# Patient Record
Sex: FEMALE | Race: WHITE | NOT HISPANIC OR LATINO | Employment: PART TIME | URBAN - METROPOLITAN AREA
[De-identification: names, ages, dates, MRNs, and addresses within clinical notes are randomized per-mention and may not be internally consistent; named-entity substitution may affect disease eponyms.]

---

## 2017-01-18 RX ORDER — HYDROCHLOROTHIAZIDE 12.5 MG/1
TABLET ORAL
Qty: 90 TABLET | Refills: 0 | Status: SHIPPED | OUTPATIENT
Start: 2017-01-18 | End: 2017-02-01

## 2017-02-01 ENCOUNTER — OFFICE VISIT (OUTPATIENT)
Dept: INTERNAL MEDICINE | Facility: CLINIC | Age: 42
End: 2017-02-01
Attending: FAMILY MEDICINE
Payer: COMMERCIAL

## 2017-02-01 VITALS
HEART RATE: 99 BPM | BODY MASS INDEX: 37.42 KG/M2 | HEIGHT: 61 IN | WEIGHT: 198.19 LBS | SYSTOLIC BLOOD PRESSURE: 146 MMHG | DIASTOLIC BLOOD PRESSURE: 94 MMHG

## 2017-02-01 DIAGNOSIS — I10 ESSENTIAL HYPERTENSION: Primary | ICD-10-CM

## 2017-02-01 PROCEDURE — 3077F SYST BP >= 140 MM HG: CPT | Mod: S$GLB,,, | Performed by: FAMILY MEDICINE

## 2017-02-01 PROCEDURE — 99999 PR PBB SHADOW E&M-EST. PATIENT-LVL III: CPT | Mod: PBBFAC,,, | Performed by: FAMILY MEDICINE

## 2017-02-01 PROCEDURE — 99213 OFFICE O/P EST LOW 20 MIN: CPT | Mod: S$GLB,,, | Performed by: FAMILY MEDICINE

## 2017-02-01 PROCEDURE — 3080F DIAST BP >= 90 MM HG: CPT | Mod: S$GLB,,, | Performed by: FAMILY MEDICINE

## 2017-02-01 RX ORDER — HYDROCHLOROTHIAZIDE 25 MG/1
25 TABLET ORAL DAILY
Qty: 90 TABLET | Refills: 0 | Status: SHIPPED | OUTPATIENT
Start: 2017-02-01 | End: 2017-03-02 | Stop reason: SDUPTHER

## 2017-02-01 NOTE — PROGRESS NOTES
Subjective:       Patient ID: Dale Trujillo is a 41 y.o. female.    Chief Complaint: No chief complaint on file.    HPI Comments: Pt presents today for HTN f/u. Log at home shows BP in ranges of 140-150/. Pt states no symptoms with HCTZ at present      Review of Systems   Constitutional: Negative.    Eyes: Negative.    Respiratory: Negative for cough, chest tightness and shortness of breath.    Cardiovascular: Negative for chest pain, palpitations and leg swelling.   Gastrointestinal: Negative.    Musculoskeletal: Negative.  Negative for joint swelling.   Skin: Negative.    Neurological: Negative for dizziness, weakness, light-headedness and headaches.       Objective:      Physical Exam   Constitutional: She is oriented to person, place, and time. She appears well-developed and well-nourished.   HENT:   Head: Normocephalic and atraumatic.   Eyes: Conjunctivae and EOM are normal. Pupils are equal, round, and reactive to light.   Neck: Normal range of motion. Neck supple. No thyromegaly present.   Cardiovascular: Normal rate, regular rhythm, normal heart sounds and intact distal pulses.    No murmur heard.  Pulmonary/Chest: Effort normal and breath sounds normal. No respiratory distress. She has no wheezes. She has no rales. She exhibits no tenderness.   Musculoskeletal: Normal range of motion. She exhibits no edema.   Lymphadenopathy:     She has no cervical adenopathy.   Neurological: She is alert and oriented to person, place, and time. She displays normal reflexes. No cranial nerve deficit. She exhibits normal muscle tone. Coordination normal.   Skin: Skin is warm. No erythema.   Psychiatric: She has a normal mood and affect. Her behavior is normal. Judgment and thought content normal.       Assessment:       HTN   Plan:       HTN still elevated. Pt advised to increase HCTZ and f/u with nurse in 4 weeks. Pt also informed that we may need to start norvasc if BP's are still elevated.  SE's of med d/w pt  ED  prompts d/w pt  RTC 4 weeks

## 2017-02-01 NOTE — MR AVS SNAPSHOT
[facility]  [address]  [place] [address]  Phone: [phone]  Fax: [phone]                  Dale Trujillo   2017 6:40 AM   Office Visit    Description:  Female : 1975   Provider:  Sharon Restrepo MD   Department:  [facility]                To Do List           Future Appointments        Provider Department Dept Phone    3/1/2017 7:00 AM NURSE, [facility] INTERNAL MEDICINE [facility]  Internal Medicine [phone]      Goals (5 Years of Data)     None       These Medications        Disp Refills Start End    hydrochlorothiazide (HYDRODIURIL) 25 MG tablet 90 tablet 0 2017 3/3/2017    Take 1 tablet (25 mg total) by mouth once daily. - Oral    Pharmacy: [facility]/pharmacy #[id number] - [place], [place] - [address]  #: [phone]         [facility] On Call     [facility] On Call Nurse Care Line -  Assistance  Registered nurses in the [facility] On Call Center provide clinical advisement, health education, appointment booking, and other advisory services.  Call for this free service at [phone].             Medications           Message regarding Medications     Verify the changes and/or additions to your medication regime listed below are the same as discussed with your clinician today.  If any of these changes or additions are incorrect, please notify your healthcare provider.        START taking these NEW medications        Refills    hydrochlorothiazide (HYDRODIURIL) 25 MG tablet 0    Sig: Take 1 tablet (25 mg total) by mouth once daily.    Class: Normal    Route: Oral           Verify that the below list of medications is an accurate representation of the medications you are currently taking.  If none reported, the list may be blank. If incorrect, please contact your healthcare provider. Carry this list with you in case of emergency.           Current Medications     albuterol 90 mcg/actuation inhaler Inhale 2 puffs into the lungs every 4  "(four) hours as needed.    BLISOVI FE 1/20, 28, 1 mg-20 mcg (21)/75 mg (7) per tablet TAKE 1 TABLET BY MOUTH ONCE DAILY.    cholecalciferol, vitamin D3, 1,000 unit capsule Take 1,000 Units by mouth once daily.    cranberry 1,000 mg Cap Take by mouth.    docusate sodium (COLACE) 50 MG capsule Take by mouth once daily.    ferrous gluconate (FERGON) 325 MG Tab Take 325 mg by mouth daily with breakfast.    fluticasone (FLONASE) 50 mcg/actuation nasal spray 2 sprays by Each Nare route once daily.    fluticasone-salmeterol 250-50 mcg/dose (ADVAIR) 250-50 mcg/dose diskus inhaler Inhale 1 puff into the lungs 2 (two) times daily.    levothyroxine (SYNTHROID) 200 MCG tablet TAKE 1 TABLET BY MOUTH EVERY DAY    loratadine (CLARITIN) 10 mg tablet Take 10 mg by mouth 2 (two) times daily.    metformin (GLUCOPHAGE) 500 MG tablet TAKE 1 TABLET (500 MG TOTAL) BY MOUTH 2 (TWO) TIMES DAILY WITH MEALS.    sulfamethoxazole-trimethoprim 800-160mg (BACTRIM DS) 800-160 mg Tab Take 1 tablet by mouth nightly.    hydrochlorothiazide (HYDRODIURIL) 25 MG tablet Take 1 tablet (25 mg total) by mouth once daily.           Clinical Reference Information           Vital Signs - Last Recorded  Most recent update: 2/1/2017  7:04 AM by Sallie Morgan MA    BP Pulse Ht Wt BMI    (!) 146/94 (BP Location: Left arm, Patient Position: Sitting, BP Method: Manual) 99 5' 1" (1.549 m) 89.9 kg (198 lb 3.1 oz) 37.45 kg/m2      Blood Pressure          Most Recent Value    BP  (!)  146/94      Allergies as of 2/1/2017     Pollen Extracts      Immunizations Administered on Date of Encounter - 2/1/2017     None      Instructions    Your test results will be communicated to you via: [facility], Telephone or Letter.  If you have not received your test results within one week. Please contact the clinic.         "

## 2017-02-08 ENCOUNTER — OFFICE VISIT (OUTPATIENT)
Dept: INTERNAL MEDICINE | Facility: CLINIC | Age: 42
End: 2017-02-08
Payer: COMMERCIAL

## 2017-02-08 ENCOUNTER — NURSE TRIAGE (OUTPATIENT)
Dept: ADMINISTRATIVE | Facility: CLINIC | Age: 42
End: 2017-02-08

## 2017-02-08 VITALS
OXYGEN SATURATION: 96 % | WEIGHT: 186.94 LBS | SYSTOLIC BLOOD PRESSURE: 116 MMHG | HEART RATE: 110 BPM | BODY MASS INDEX: 36.7 KG/M2 | DIASTOLIC BLOOD PRESSURE: 84 MMHG | HEIGHT: 60 IN

## 2017-02-08 DIAGNOSIS — B34.9 VIRAL SYNDROME: Primary | ICD-10-CM

## 2017-02-08 LAB
CTP QC/QA: YES
FLUAV AG NPH QL: NEGATIVE
FLUBV AG NPH QL: NEGATIVE

## 2017-02-08 PROCEDURE — 87804 INFLUENZA ASSAY W/OPTIC: CPT | Mod: QW,S$GLB,, | Performed by: INTERNAL MEDICINE

## 2017-02-08 PROCEDURE — 3079F DIAST BP 80-89 MM HG: CPT | Mod: S$GLB,,, | Performed by: INTERNAL MEDICINE

## 2017-02-08 PROCEDURE — 99999 PR PBB SHADOW E&M-EST. PATIENT-LVL III: CPT | Mod: PBBFAC,,, | Performed by: INTERNAL MEDICINE

## 2017-02-08 PROCEDURE — 99213 OFFICE O/P EST LOW 20 MIN: CPT | Mod: S$GLB,,, | Performed by: INTERNAL MEDICINE

## 2017-02-08 PROCEDURE — 3074F SYST BP LT 130 MM HG: CPT | Mod: S$GLB,,, | Performed by: INTERNAL MEDICINE

## 2017-02-08 RX ORDER — PROMETHAZINE HYDROCHLORIDE 25 MG/1
25 TABLET ORAL 3 TIMES DAILY PRN
Qty: 30 TABLET | Refills: 0 | Status: SHIPPED | OUTPATIENT
Start: 2017-02-08

## 2017-02-08 NOTE — TELEPHONE ENCOUNTER
Since Monday stomach has been irritated,can't really describe irritated. no fever, stuffy and just woke up and vomited once. Thinks she is dehydrated states last void a couple of hours ago. . Skipped lunch yesterday. Has not been drinking liquids. End of monthly cycle. Vomited once this morning. Advised not necessarily signs of dehydration. Offered an appt for today.

## 2017-02-08 NOTE — PROGRESS NOTES
Subjective:       Patient ID: Dale Trujillo is a 41 y.o. female.    Chief Complaint: Nausea    HPI Comments:   Pt c/o upper abd discomfort x 2 days.  This is 'slight.'  She had also been having a decreased appetite over that time frame and this morning vomited x 1.  She has also been having some recent head and nasal congestion.  She was taking a decongestant for this and had some improvement and so stopped taking it.  She feels the congestion has now moved to her upper chest.  Cough is mild, intermittent and occasionally productive.  She is also having some generalized fatigue.  No fever.     She is having some belching.  Denies current heartburn sx.  Some looser BMs         Nausea   Associated symptoms include nausea.     Review of Systems   Constitutional: Negative.    HENT: Negative.    Eyes: Negative.    Respiratory: Negative.    Cardiovascular: Negative.    Gastrointestinal: Positive for nausea.   Genitourinary: Negative.    Musculoskeletal: Negative.    Skin: Negative.    Neurological: Negative.    Psychiatric/Behavioral: Negative.        Objective:       Vitals:    02/08/17 0938   BP: 116/84   Pulse: 110   SpO2: 96%   Weight: 84.8 kg (186 lb 15.2 oz)   Height: 5' (1.524 m)     Physical Exam   Constitutional: She appears well-developed and well-nourished. No distress.   HENT:   Head: Normocephalic and atraumatic.   Right Ear: Tympanic membrane, external ear and ear canal normal.   Left Ear: Tympanic membrane, external ear and ear canal normal.   Mouth/Throat: Uvula is midline, oropharynx is clear and moist and mucous membranes are normal. No oropharyngeal exudate or posterior oropharyngeal erythema.   Eyes: Conjunctivae and EOM are normal. Pupils are equal, round, and reactive to light.   Neck: Normal range of motion. Neck supple.   Cardiovascular: Normal rate, regular rhythm and normal heart sounds.  Exam reveals no gallop and no friction rub.    No murmur heard.  Pulmonary/Chest: Effort normal and breath  sounds normal. No respiratory distress. She has no wheezes. She has no rhonchi. She has no rales.   Abdominal: Soft. Bowel sounds are normal. She exhibits no distension. There is no tenderness. There is no rebound and no guarding.   Lymphadenopathy:     She has no cervical adenopathy.   Skin: She is not diaphoretic.       Assessment:       1. Viral syndrome        Plan:              Viral syndrome - Rapid flu was negative.  Presumably this is a non-influenza virus.  OTC meds PRN.

## 2017-02-08 NOTE — TELEPHONE ENCOUNTER
Reason for Disposition   Nursing judgment    Protocols used: ST NO GUIDELINE OR REFERENCE [id number]

## 2017-02-08 NOTE — MR AVS SNAPSHOT
[facility]  [address]  [place] [address]  Phone: [phone]  Fax: [phone]                  Dale Trujillo   2017 9:40 AM   Office Visit    Description:  Female : 1975   Provider:  Brad Rodriguez MD   Department:  [facility]           Reason for Visit     Nausea           Diagnoses this Visit        Comments    Viral syndrome    -  Primary            To Do List           Future Appointments        Provider Department Dept Phone    3/1/2017 7:00 AM NURSE, [facility] INTERNAL MEDICINE [facility]  Internal Medicine [phone]      Goals (5 Years of Data)     None       These Medications        Disp Refills Start End    promethazine (PHENERGAN) 25 MG tablet 30 tablet 0 2017     Take 1 tablet (25 mg total) by mouth 3 (three) times daily as needed for Nausea. - Oral    Pharmacy: [facility]/pharmacy #[id number] - [place], [place] - [address] Ph #: [phone]         [facility] On Call     [facility] On Call Nurse Care Line -  Assistance  Registered nurses in the [facility] On Call Center provide clinical advisement, health education, appointment booking, and other advisory services.  Call for this free service at [phone].             Medications           Message regarding Medications     Verify the changes and/or additions to your medication regime listed below are the same as discussed with your clinician today.  If any of these changes or additions are incorrect, please notify your healthcare provider.        START taking these NEW medications        Refills    promethazine (PHENERGAN) 25 MG tablet 0    Sig: Take 1 tablet (25 mg total) by mouth 3 (three) times daily as needed for Nausea.    Class: Normal    Route: Oral           Verify that the below list of medications is an accurate representation of the medications you are currently taking.  If none reported, the list may be blank. If incorrect, please contact your healthcare provider.  Carry this list with you in case of emergency.           Current Medications     albuterol 90 mcg/actuation inhaler Inhale 2 puffs into the lungs every 4 (four) hours as needed.    BLISOVI FE 1/20, 28, 1 mg-20 mcg (21)/75 mg (7) per tablet TAKE 1 TABLET BY MOUTH ONCE DAILY.    cholecalciferol, vitamin D3, 1,000 unit capsule Take 1,000 Units by mouth once daily.    cranberry 1,000 mg Cap Take by mouth.    docusate sodium (COLACE) 50 MG capsule Take by mouth once daily.    ferrous gluconate (FERGON) 325 MG Tab Take 325 mg by mouth daily with breakfast.    fluticasone (FLONASE) 50 mcg/actuation nasal spray 2 sprays by Each Nare route once daily.    fluticasone-salmeterol 250-50 mcg/dose (ADVAIR) 250-50 mcg/dose diskus inhaler Inhale 1 puff into the lungs 2 (two) times daily.    hydrochlorothiazide (HYDRODIURIL) 25 MG tablet Take 1 tablet (25 mg total) by mouth once daily.    levothyroxine (SYNTHROID) 200 MCG tablet TAKE 1 TABLET BY MOUTH EVERY DAY    loratadine (CLARITIN) 10 mg tablet Take 10 mg by mouth 2 (two) times daily.    metformin (GLUCOPHAGE) 500 MG tablet TAKE 1 TABLET (500 MG TOTAL) BY MOUTH 2 (TWO) TIMES DAILY WITH MEALS.    promethazine (PHENERGAN) 25 MG tablet Take 1 tablet (25 mg total) by mouth 3 (three) times daily as needed for Nausea.    sulfamethoxazole-trimethoprim 800-160mg (BACTRIM DS) 800-160 mg Tab Take 1 tablet by mouth nightly.           Clinical Reference Information           Your Vitals Were     BP Pulse Height Weight Last Period SpO2    116/84 110 5' (1.524 m) 84.8 kg (186 lb 15.2 oz) 02/01/2017 96%    BMI                36.51 kg/m2          Blood Pressure          Most Recent Value    BP  116/84      Allergies as of 2/8/2017     Pollen Extracts      Immunizations Administered on Date of Encounter - 2/8/2017     None      Orders Placed During Today's Visit      Normal Orders This Visit    POCT Influenza A/B          2/8/2017 10:41 AM - Kamila Taylor MA      Component Results      Component Value Flag Ref Range Units Status    Rapid Influenza A Ag Negative  Negative  Final    Rapid Influenza B Ag Negative  Negative  Final     Acceptable Yes    Final            Language Assistance Services     ATTENTION: Language assistance services are available, free of charge. Please call [phone].      ATENCIÓN: Si habla logan, tiene a high disposición servicios gratuitos de asistencia lingüística. Llame al [phone].     CHÚ Ý: N?u b?n nói Ti?ng Vi?t, có các d?ch v? h? tr? ngôn ng? mi?n phí dành cho b?n. G?i s? [phone].         [facility] complies with applicable Federal civil rights laws and does not discriminate on the basis of race, color, national origin, age, disability, or sex.

## 2017-02-13 RX ORDER — METFORMIN HYDROCHLORIDE 500 MG/1
TABLET ORAL
Qty: 180 TABLET | Refills: 0 | Status: SHIPPED | OUTPATIENT
Start: 2017-02-13 | End: 2017-03-16 | Stop reason: SDUPTHER

## 2017-02-20 RX ORDER — LEVOTHYROXINE SODIUM 200 UG/1
TABLET ORAL
Qty: 90 TABLET | Refills: 0 | Status: SHIPPED | OUTPATIENT
Start: 2017-02-20 | End: 2017-05-14 | Stop reason: SDUPTHER

## 2017-02-24 RX ORDER — SULFAMETHOXAZOLE AND TRIMETHOPRIM 800; 160 MG/1; MG/1
TABLET ORAL
Qty: 90 TABLET | Refills: 0 | Status: SHIPPED | OUTPATIENT
Start: 2017-02-24 | End: 2017-05-20 | Stop reason: SDUPTHER

## 2017-03-01 ENCOUNTER — CLINICAL SUPPORT (OUTPATIENT)
Dept: INTERNAL MEDICINE | Facility: CLINIC | Age: 42
End: 2017-03-01
Payer: COMMERCIAL

## 2017-03-01 ENCOUNTER — TELEPHONE (OUTPATIENT)
Dept: INTERNAL MEDICINE | Facility: CLINIC | Age: 42
End: 2017-03-01

## 2017-03-01 NOTE — PROGRESS NOTES
Dale Trujillo 41 y.o. female is here today for Blood Pressure check.   History of HTN yes.    Review of patient's allergies indicates:   Allergen Reactions    Pollen extracts      Creatinine   Date Value Ref Range Status   11/26/2016 1.1 0.5 - 1.4 mg/dL Final     Sodium   Date Value Ref Range Status   11/26/2016 138 136 - 145 mmol/L Final     Potassium   Date Value Ref Range Status   11/26/2016 3.9 3.5 - 5.1 mmol/L Final   ]  Patient verifies taking blood pressure medications on a regular bases at the same time of the day.     Current Outpatient Prescriptions:     albuterol 90 mcg/actuation inhaler, Inhale 2 puffs into the lungs every 4 (four) hours as needed., Disp: 18 g, Rfl: 5    BLISOVI FE 1/20, 28, 1 mg-20 mcg (21)/75 mg (7) per tablet, TAKE 1 TABLET BY MOUTH ONCE DAILY., Disp: 28 tablet, Rfl: 11    cholecalciferol, vitamin D3, 1,000 unit capsule, Take 1,000 Units by mouth once daily., Disp: , Rfl:     cranberry 1,000 mg Cap, Take by mouth., Disp: , Rfl:     docusate sodium (COLACE) 50 MG capsule, Take by mouth once daily., Disp: , Rfl:     ferrous gluconate (FERGON) 325 MG Tab, Take 325 mg by mouth daily with breakfast., Disp: , Rfl:     fluticasone (FLONASE) 50 mcg/actuation nasal spray, 2 sprays by Each Nare route once daily., Disp: 16 Bottle, Rfl: 11    fluticasone-salmeterol 250-50 mcg/dose (ADVAIR) 250-50 mcg/dose diskus inhaler, Inhale 1 puff into the lungs 2 (two) times daily., Disp: 1 each, Rfl: 5    hydrochlorothiazide (HYDRODIURIL) 25 MG tablet, Take 1 tablet (25 mg total) by mouth once daily., Disp: 90 tablet, Rfl: 0    levothyroxine (SYNTHROID) 200 MCG tablet, TAKE 1 TABLET BY MOUTH EVERY DAY, Disp: 90 tablet, Rfl: 0    loratadine (CLARITIN) 10 mg tablet, Take 10 mg by mouth 2 (two) times daily., Disp: , Rfl:     metformin (GLUCOPHAGE) 500 MG tablet, TAKE 1 TABLET (500 MG TOTAL) BY MOUTH 2 (TWO) TIMES DAILY WITH MEALS., Disp: 180 tablet, Rfl: 0    promethazine (PHENERGAN) 25 MG tablet,  Take 1 tablet (25 mg total) by mouth 3 (three) times daily as needed for Nausea., Disp: 30 tablet, Rfl: 0    sulfamethoxazole-trimethoprim 800-160mg (BACTRIM DS) 800-160 mg Tab, TAKE 1 TABLET BY MOUTH NIGHTLY., Disp: 90 tablet, Rfl: 0  Does patient have record of home blood pressure readings yes. Readings have been averaging 143/98 146/87 136/87.   Last dose of blood pressure medication was taken at 6am.  Patient is symptomatic  Complains of occasional headaches.      ,  .    Blood pressure reading after 15 minutes was 122/80, Pulse 103.  Dr. Restrepo notified.

## 2017-03-02 RX ORDER — HYDROCHLOROTHIAZIDE 25 MG/1
TABLET ORAL
Qty: 90 TABLET | Refills: 0 | Status: SHIPPED | OUTPATIENT
Start: 2017-03-02 | End: 2017-06-01 | Stop reason: SDUPTHER

## 2017-03-14 ENCOUNTER — TELEPHONE (OUTPATIENT)
Dept: INTERNAL MEDICINE | Facility: CLINIC | Age: 42
End: 2017-03-14

## 2017-03-14 ENCOUNTER — NURSE TRIAGE (OUTPATIENT)
Dept: ADMINISTRATIVE | Facility: CLINIC | Age: 42
End: 2017-03-14

## 2017-03-14 NOTE — TELEPHONE ENCOUNTER
Spoke with the pt in regards to symptoms of elevated blood pressure and dizziness , informed that if symptoms persist needs to follow up. Asked how long c/o dizziness since Saturday but not as bad but just about everyday. Advised per Dr. Restrepo needs to f/u scheduled an appointment tomorrow. Conversation was understood and it ended.

## 2017-03-14 NOTE — TELEPHONE ENCOUNTER
Reason for Disposition   Taking a medicine that could cause dizziness (e.g., blood pressure medications, diuretics)    Protocols used: ST DIZZINESS-A-OH    Dale is calling to report that she has been having dizzy spells since Sunday.  Mostly with position change.  Is on Blood Pressure medication.  Please contact Dale to advise at [phone]

## 2017-03-14 NOTE — TELEPHONE ENCOUNTER
Patient states Sunday morning she started to feel dizzy. Dizziness is when she is Standing to sitting, looking down to up. Patient states she took decongestant and it helps but she is still lightheaded. She experience these symptoms more when she stands. Please advise.

## 2017-03-14 NOTE — TELEPHONE ENCOUNTER
Unclear of etiology. She needs to have documented BP's and should make an appt to assess should symptoms persist.  Please inform.  PV

## 2017-03-15 ENCOUNTER — OFFICE VISIT (OUTPATIENT)
Dept: INTERNAL MEDICINE | Facility: CLINIC | Age: 42
End: 2017-03-15
Attending: FAMILY MEDICINE
Payer: COMMERCIAL

## 2017-03-15 VITALS
BODY MASS INDEX: 37.11 KG/M2 | WEIGHT: 190.06 LBS | DIASTOLIC BLOOD PRESSURE: 88 MMHG | SYSTOLIC BLOOD PRESSURE: 128 MMHG | HEART RATE: 103 BPM

## 2017-03-15 DIAGNOSIS — I10 ESSENTIAL HYPERTENSION: Primary | ICD-10-CM

## 2017-03-15 DIAGNOSIS — J30.1 SEASONAL ALLERGIC RHINITIS DUE TO POLLEN: ICD-10-CM

## 2017-03-15 PROCEDURE — 1160F RVW MEDS BY RX/DR IN RCRD: CPT | Mod: S$GLB,,, | Performed by: FAMILY MEDICINE

## 2017-03-15 PROCEDURE — 99999 PR PBB SHADOW E&M-EST. PATIENT-LVL III: CPT | Mod: PBBFAC,,, | Performed by: FAMILY MEDICINE

## 2017-03-15 PROCEDURE — 99213 OFFICE O/P EST LOW 20 MIN: CPT | Mod: S$GLB,,, | Performed by: FAMILY MEDICINE

## 2017-03-15 PROCEDURE — 3079F DIAST BP 80-89 MM HG: CPT | Mod: S$GLB,,, | Performed by: FAMILY MEDICINE

## 2017-03-15 PROCEDURE — 3074F SYST BP LT 130 MM HG: CPT | Mod: S$GLB,,, | Performed by: FAMILY MEDICINE

## 2017-03-15 NOTE — MR AVS SNAPSHOT
[facility]  [address]  [place] [address]  Phone: [phone]  Fax: [phone]                  Dale Trujillo   3/15/2017 8:20 AM   Office Visit    Description:  Female : 1975   Provider:  Sharon Restrepo MD   Department:  [facility]           Reason for Visit     elevated blood pressure     Sinus Problem     Dizziness                To Do List           Future Appointments        Provider Department Dept Phone    3/20/2017 10:15 AM MD [facility] [phone]    3/20/2017 1:20 PM MD [facility] - Allergy/ Immunology [phone]      Goals (5 Years of Data)     None      [facility]     [facility] Nurse Care Line -  Assistance  Registered nurses in the [facility] On Call Center provide clinical advisement, health education, appointment booking, and other advisory services.  Call for this free service at [phone].             Medications           Message regarding Medications     Verify the changes and/or additions to your medication regime listed below are the same as discussed with your clinician today.  If any of these changes or additions are incorrect, please notify your healthcare provider.             Verify that the below list of medications is an accurate representation of the medications you are currently taking.  If none reported, the list may be blank. If incorrect, please contact your healthcare provider. Carry this list with you in case of emergency.           Current Medications     albuterol 90 mcg/actuation inhaler Inhale 2 puffs into the lungs every 4 (four) hours as needed.    BLISOVI FE , 28, 1 mg-20 mcg (21)/75 mg (7) per tablet TAKE 1 TABLET BY MOUTH ONCE DAILY.    cholecalciferol, vitamin D3, 1,000 unit capsule Take 1,000 Units by mouth once daily.    cranberry 1,000 mg Cap Take by mouth.    docusate sodium (COLACE) 50 MG capsule Take by mouth once daily.     ferrous gluconate (FERGON) 325 MG Tab Take 325 mg by mouth daily with breakfast.    fluticasone (FLONASE) 50 mcg/actuation nasal spray 2 sprays by Each Nare route once daily.    fluticasone-salmeterol 250-50 mcg/dose (ADVAIR) 250-50 mcg/dose diskus inhaler Inhale 1 puff into the lungs 2 (two) times daily.    hydrochlorothiazide (HYDRODIURIL) 25 MG tablet TAKE 1 TABLET (25 MG TOTAL) BY MOUTH ONCE DAILY.    levothyroxine (SYNTHROID) 200 MCG tablet TAKE 1 TABLET BY MOUTH EVERY DAY    loratadine (CLARITIN) 10 mg tablet Take 10 mg by mouth 2 (two) times daily.    metformin (GLUCOPHAGE) 500 MG tablet TAKE 1 TABLET (500 MG TOTAL) BY MOUTH 2 (TWO) TIMES DAILY WITH MEALS.    promethazine (PHENERGAN) 25 MG tablet Take 1 tablet (25 mg total) by mouth 3 (three) times daily as needed for Nausea.    sulfamethoxazole-trimethoprim 800-160mg (BACTRIM DS) 800-160 mg Tab TAKE 1 TABLET BY MOUTH NIGHTLY.           Clinical Reference Information           Your Vitals Were     BP Pulse Weight BMI       128/88 (BP Location: Left arm, Patient Position: Sitting, BP Method: Manual) 103 86.2 kg (190 lb 0.6 oz) 37.11 kg/m2       Blood Pressure          Most Recent Value    BP  128/88      Allergies as of 3/15/2017     Pollen Extracts      Immunizations Administered on Date of Encounter - 3/15/2017     None      Language Assistance Services     ATTENTION: Language assistance services are available, free of charge. Please call [phone].      ATENCIÓN: Si arvind ford, tiene a high disposición servicios gratuitos de asistencia lingüística. Llame al [phone].     CHÚ Ý: N?u b?n nói Ti?ng Vi?t, có các d?ch v? h? tr? ngôn ng? mi?n phí dành cho b?n. G?i s? [phone].         [facility] complies with applicable Federal civil rights laws and does not discriminate on the basis of race, color, national origin, age, disability, or sex.

## 2017-03-15 NOTE — PROGRESS NOTES
Subjective:       Patient ID: Dale Trujillo is a 41 y.o. female.    Chief Complaint: elevated blood pressure; Sinus Problem (x 4 days); and Dizziness (x  4 days)    Sinus Problem       Review of Systems   Neurological: Positive for dizziness.       Objective:      Physical Exam    Assessment:       No diagnosis found.    Plan:       ***

## 2017-03-16 RX ORDER — METFORMIN HYDROCHLORIDE 500 MG/1
TABLET ORAL
Qty: 180 TABLET | Refills: 0 | Status: SHIPPED | OUTPATIENT
Start: 2017-03-16 | End: 2017-04-13 | Stop reason: SDUPTHER

## 2017-03-20 ENCOUNTER — OFFICE VISIT (OUTPATIENT)
Dept: SURGERY | Facility: CLINIC | Age: 42
End: 2017-03-20
Payer: COMMERCIAL

## 2017-03-20 ENCOUNTER — OFFICE VISIT (OUTPATIENT)
Dept: ALLERGY | Facility: CLINIC | Age: 42
End: 2017-03-20
Payer: COMMERCIAL

## 2017-03-20 VITALS
BODY MASS INDEX: 36.08 KG/M2 | HEART RATE: 97 BPM | DIASTOLIC BLOOD PRESSURE: 94 MMHG | WEIGHT: 184.75 LBS | SYSTOLIC BLOOD PRESSURE: 148 MMHG

## 2017-03-20 VITALS
OXYGEN SATURATION: 97 % | SYSTOLIC BLOOD PRESSURE: 124 MMHG | BODY MASS INDEX: 36.4 KG/M2 | HEIGHT: 60 IN | WEIGHT: 185.44 LBS | HEART RATE: 100 BPM | DIASTOLIC BLOOD PRESSURE: 90 MMHG

## 2017-03-20 DIAGNOSIS — I10 ESSENTIAL HYPERTENSION: ICD-10-CM

## 2017-03-20 DIAGNOSIS — Q64.8 CONGENITAL HYPOPLASIA OF BLADDER: ICD-10-CM

## 2017-03-20 DIAGNOSIS — Z12.11 COLON CANCER SCREENING: Primary | ICD-10-CM

## 2017-03-20 DIAGNOSIS — E11.9 DIABETES MELLITUS DUE TO INSULIN RECEPTOR ANTIBODIES: ICD-10-CM

## 2017-03-20 DIAGNOSIS — E03.9 ACQUIRED HYPOTHYROIDISM: ICD-10-CM

## 2017-03-20 DIAGNOSIS — H10.13 ALLERGIC CONJUNCTIVITIS OF BOTH EYES: ICD-10-CM

## 2017-03-20 DIAGNOSIS — J45.909 UNCOMPLICATED ASTHMA, UNSPECIFIED ASTHMA SEVERITY: Primary | ICD-10-CM

## 2017-03-20 DIAGNOSIS — J30.2 SEASONAL ALLERGIC RHINITIS, UNSPECIFIED ALLERGIC RHINITIS TRIGGER: ICD-10-CM

## 2017-03-20 PROCEDURE — 3080F DIAST BP >= 90 MM HG: CPT | Mod: S$GLB,,, | Performed by: COLON & RECTAL SURGERY

## 2017-03-20 PROCEDURE — 3060F POS MICROALBUMINURIA REV: CPT | Mod: S$GLB,,, | Performed by: ALLERGY & IMMUNOLOGY

## 2017-03-20 PROCEDURE — 3074F SYST BP LT 130 MM HG: CPT | Mod: S$GLB,,, | Performed by: COLON & RECTAL SURGERY

## 2017-03-20 PROCEDURE — 2022F DILAT RTA XM EVC RTNOPTHY: CPT | Mod: S$GLB,,, | Performed by: ALLERGY & IMMUNOLOGY

## 2017-03-20 PROCEDURE — G0104 CA SCREEN;FLEXI SIGMOIDSCOPE: HCPCS | Mod: S$GLB,,, | Performed by: COLON & RECTAL SURGERY

## 2017-03-20 PROCEDURE — 3044F HG A1C LEVEL LT 7.0%: CPT | Mod: S$GLB,,, | Performed by: ALLERGY & IMMUNOLOGY

## 2017-03-20 PROCEDURE — 99999 PR PBB SHADOW E&M-EST. PATIENT-LVL II: CPT | Mod: PBBFAC,,, | Performed by: COLON & RECTAL SURGERY

## 2017-03-20 PROCEDURE — 1160F RVW MEDS BY RX/DR IN RCRD: CPT | Mod: S$GLB,,, | Performed by: ALLERGY & IMMUNOLOGY

## 2017-03-20 PROCEDURE — 3080F DIAST BP >= 90 MM HG: CPT | Mod: S$GLB,,, | Performed by: ALLERGY & IMMUNOLOGY

## 2017-03-20 PROCEDURE — 99212 OFFICE O/P EST SF 10 MIN: CPT | Mod: 25,S$GLB,, | Performed by: COLON & RECTAL SURGERY

## 2017-03-20 PROCEDURE — 1160F RVW MEDS BY RX/DR IN RCRD: CPT | Mod: S$GLB,,, | Performed by: COLON & RECTAL SURGERY

## 2017-03-20 PROCEDURE — 99214 OFFICE O/P EST MOD 30 MIN: CPT | Mod: S$GLB,,, | Performed by: ALLERGY & IMMUNOLOGY

## 2017-03-20 PROCEDURE — 3074F SYST BP LT 130 MM HG: CPT | Mod: S$GLB,,, | Performed by: ALLERGY & IMMUNOLOGY

## 2017-03-20 PROCEDURE — 99999 PR PBB SHADOW E&M-EST. PATIENT-LVL III: CPT | Mod: PBBFAC,,, | Performed by: ALLERGY & IMMUNOLOGY

## 2017-03-20 RX ORDER — AZELASTINE 1 MG/ML
1-2 SPRAY, METERED NASAL 2 TIMES DAILY PRN
Qty: 30 ML | Refills: 5 | Status: SHIPPED | OUTPATIENT
Start: 2017-03-20 | End: 2017-06-19

## 2017-03-20 NOTE — PROGRESS NOTES
"Dale Trujillo returns to clinic today for continued evaluation of allergic rhinitis and asthma. She is here alone. She was last seen March 28, 2016.    After her last visit, she did take topical nasal steroids and antihistamines for several months in the spring was over.    After this she was able to reduce her medications with adequate control of her symptoms.    She did have a "sinus infection" last spring and was given an unknown medication in urgent care center.    She has had recurrent dizziness. She has popping every ears with pressure sensation.    She has increased her Flonase to two sprays each nostril daily. She is also started taking Claritin daily.    Her asthma has been controlled with as needed albuterol. She has not needed any Advair.    She is moving back to [place] next summer. A friend is getting  the first week of August and she would like to be there but then. She is currently looking for a job.    In the past year she has been started on metformin for type 2 diabetes.    She was also started on hydrochlorothiazide for hypertension.    [facility] PEQ ALLERGY QUESTIONNAIRE SHORT 3/20/2017   Are you taking any new medications since your last visit? Yes   Constitution: No symptoms   Head or facial pain: No symptoms   Eyes: No symptoms   Ears: Fullness   Nose: Post nasal drip   Throat: Frequent clearing   Sinuses: Sinus infections   Lungs: Use of inhalers   Skin: No symptoms   Cardiovascular: High blood pressue   Gastrointestinal: No symptoms   Genital/ urinary Frequency of urination   Musculoskeletal: No symptoms   Neurologic: Dizziness, Headaches   Endocrine: Diabetes   Hematologic: No symptoms     Physical Examination:  General: Well-developed, well-nourished, no acute distress.  Obese.  Head: No sinus tenderness.  Eyes: Conjunctivae:  No bulbar or palpebral conjunctival injection.  Ears: EAC's clear.  TM's clear.  No pre-auricular nodes.  Nose: Nasal Mucosa:  Pink.  Septum: No apparent " deviation.  Turbinates:  No significant edema.  Polyps/Mass:  None visible.  Teeth/Gums:  No bleeding noted.  Oropharynx: No exudates.  Neck: Supple without thyromegaly. No cervical lymphadenopathy.    Respiratory/Chest: Effort: Good.  Auscultation:  Clear bilaterally.  Skin: Good turgor.  No urticaria or angioedema.  Neuro/Psych: Oriented x 3.    Laboratory 11/7/2013:  IgE level: Less than 35.  Class II:  DM.    Spirometry 11/7/2013:  Normal.    Outside medical records are reviewed.  They are scanned into Epic.    Assessment:  1. Allergic rhinitis.  2. Allergic asthma.  3. Mild GERD, controlled.  4. Consider LPR.  5. Hypothyroidism on replacement  6. Anemia, iron deficiency.  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  8. Type 2 diabetes on metformin.  9. Hypertension on hydrochlorothiazide.    Recommendations:  1. House dust mite avoidance.  2. Continue Fluticasone 2 sprays each nostril daily.  3. Claritin as needed.  4. Albuterol as needed.  5. Return to clinic in 4-5 months before leaving for [place].

## 2017-03-20 NOTE — PROGRESS NOTES
CRS Office Visit    SUBJECTIVE:     Chief Complaint: Follow up for b/l ureterosigmostomies    History of Present Illness:  Patient is a 41 y.o. female presents with history of ureterosigmoidostomies and need for annual flexible sigmoidoscopy to assess colon for neoplasia.      Review of patient's allergies indicates:   Allergen Reactions    Pollen extracts        Past Medical History:   Diagnosis Date    Allergy     Anemia     Chronic kidney disease     Renal agenesis unilaterally    Congenital hypoplasia of bladder     Thyroid disease      Past Surgical History:   Procedure Laterality Date    URETERAL REIMPLANTATION OF TRANSPLANTED KIDNEY       Family History   Problem Relation Age of Onset    Breast cancer Neg Hx     Ovarian cancer Neg Hx     Colon cancer Neg Hx      Social History   Substance Use Topics    Smoking status: Never Smoker    Smokeless tobacco: Never Used    Alcohol use 1.8 oz/week     1 Glasses of wine, 1 Cans of beer, 1 Shots of liquor per week        Review of Systems:  Constitutional: no fever or chills  Eyes: no visual changes  ENT: no nasal congestion or sore throat  Respiratory: no cough or shortness of breath  Cardiovascular: no chest pain or palpitations  Gastrointestinal: no nausea or vomiting, tolerating diet    OBJECTIVE:     Vital Signs (Most Recent)  Pulse: 97 (03/20/17 1010)  BP: (!) 148/94 (03/20/17 1010)    Physical Exam:  General: White female in NAD sitting in chair in clinic  Neuro: aaox4 maex4 perrl  Respiratory: resps even unlabored  Cardiac: cap refill <2 sec  Abdomen: Normal, benign.  Extremities: Warm dry and intact  Anorectal: negative    SUBJECTIVE:   This patient presents for flexible sigmoidoscopy.   Indications: most recent sigmoid exam 1 years ago, bilateral ureterosigmostomies    OBJECTIVE:   She appears well, vitals are normal. Abdomen is normal; soft, non tender, no organomegaly or masses.  Anus - normal.  Digital rectal is normal.    PROCEDURE:    After  explaining the procedure, informed consent was obtained. Using the Olympus OSF-3 instrument, flexible sigmoidoscopy was carried out.  Proceeded to 30 cm. Further exam limited by: full length of scope inserted.   Findings: normal mucosa without polyps, tumors or diverticula, no biopsies taken, bowel prep was adequate, procedure well tolerated without complications.    No complications were encountered;  the procedure was well   tolerated.  The patient is asked to call if any unusual pains or   bleeding occur.  May resume normal diet and activities at this time.    ASSESSMENT:   normal flexible sigmoidoscopy in patient with bilateral ureterosigmoidostomies     PLAN:   Per orders. Post-procedure instructions are given to the patient.   Recommendations: repeat exam in 1 year     Sonido Kan MD  Colon and Rectal Surgery Fellow  [phone]      I have personally taken the history and examined this patient and agree with the resident's note as stated above.    Heber Fink

## 2017-03-21 NOTE — PROGRESS NOTES
Subjective:       Patient ID: Dale Trujillo is a 41 y.o. female.    Chief Complaint: elevated blood pressure; Sinus Problem (x 4 days); and Dizziness (x  4 days)    HPI Comments: Pt presents today for elevated blood pressures that have since normalized per pt as well as dizziness. Per pt, dizziness is concerning and when BP's were up she was worried that this was cause of her issues.  However, pt is obviously congested today and states that she is still dizzy with normal BP's in office. Pt does suffer from seasonal allergies. Denies n/v/HA/f chills/SOB/CP    Sinus Problem   Associated symptoms include congestion, ear pain, headaches and sinus pressure. Pertinent negatives include no coughing or shortness of breath.   Dizziness:    Associated symptoms: ear pain and headaches.no weakness, no light-headedness, no palpitations and no chest pain.    Review of Systems   Constitutional: Negative.    HENT: Positive for congestion, ear pain, postnasal drip, rhinorrhea and sinus pressure.    Eyes: Negative.    Respiratory: Negative for cough, chest tightness and shortness of breath.    Cardiovascular: Negative for chest pain, palpitations and leg swelling.   Gastrointestinal: Negative.    Musculoskeletal: Negative.  Negative for joint swelling.   Skin: Negative.    Neurological: Positive for dizziness and headaches. Negative for weakness and light-headedness.       Objective:      Physical Exam   Constitutional: She is oriented to person, place, and time. She appears well-developed and well-nourished.   HENT:   Head: Normocephalic and atraumatic.   Eyes: Conjunctivae and EOM are normal. Pupils are equal, round, and reactive to light.   Neck: Normal range of motion. Neck supple. No thyromegaly present.   Cardiovascular: Normal rate, regular rhythm, normal heart sounds and intact distal pulses.  Exam reveals no gallop and no friction rub.    No murmur heard.  Pulmonary/Chest: Effort normal and breath sounds normal. No  respiratory distress. She has no wheezes. She has no rales. She exhibits no tenderness.   Musculoskeletal: Normal range of motion. She exhibits no edema, tenderness or deformity.   Lymphadenopathy:     She has no cervical adenopathy.   Neurological: She is alert and oriented to person, place, and time. She displays normal reflexes. No cranial nerve deficit. She exhibits normal muscle tone. Coordination normal.   Skin: Skin is warm. No erythema.   Psychiatric: She has a normal mood and affect. Her behavior is normal. Judgment and thought content normal.       Assessment:       Seasonal allergies  Dizziness  Elevated blood pressures  Plan:       HTN: controlled today on meds. Pt was taking decongestant and may be cause.   Dizziness: suspect is related to her seasonal allergies since middle ear seems to be cause of this. Explained to pt to move slowly, take allergy meds and use flonase  If symptoms persist will refer to neuro or trial of meclizine  RTC prn symptoms or q 3 mos

## 2017-04-04 ENCOUNTER — OFFICE VISIT (OUTPATIENT)
Dept: INTERNAL MEDICINE | Facility: CLINIC | Age: 42
End: 2017-04-04
Attending: FAMILY MEDICINE
Payer: COMMERCIAL

## 2017-04-04 ENCOUNTER — NURSE TRIAGE (OUTPATIENT)
Dept: ADMINISTRATIVE | Facility: CLINIC | Age: 42
End: 2017-04-04

## 2017-04-04 ENCOUNTER — HOSPITAL ENCOUNTER (OUTPATIENT)
Dept: RADIOLOGY | Facility: HOSPITAL | Age: 42
Discharge: HOME | End: 2017-04-04
Attending: FAMILY MEDICINE
Payer: COMMERCIAL

## 2017-04-04 VITALS
HEIGHT: 60 IN | SYSTOLIC BLOOD PRESSURE: 128 MMHG | BODY MASS INDEX: 37.92 KG/M2 | HEART RATE: 96 BPM | WEIGHT: 193.13 LBS | DIASTOLIC BLOOD PRESSURE: 82 MMHG

## 2017-04-04 DIAGNOSIS — M79.671 PAIN IN RIGHT FOOT: ICD-10-CM

## 2017-04-04 DIAGNOSIS — I10 ESSENTIAL HYPERTENSION: ICD-10-CM

## 2017-04-04 DIAGNOSIS — M79.671 PAIN IN RIGHT FOOT: Primary | ICD-10-CM

## 2017-04-04 PROCEDURE — 73630 X-RAY EXAM OF FOOT: CPT | Mod: TC,PO,RT

## 2017-04-04 PROCEDURE — 3074F SYST BP LT 130 MM HG: CPT | Mod: S$GLB,,, | Performed by: FAMILY MEDICINE

## 2017-04-04 PROCEDURE — 99213 OFFICE O/P EST LOW 20 MIN: CPT | Mod: S$GLB,,, | Performed by: FAMILY MEDICINE

## 2017-04-04 PROCEDURE — 73630 X-RAY EXAM OF FOOT: CPT | Mod: 26,RT,, | Performed by: RADIOLOGY

## 2017-04-04 PROCEDURE — 1160F RVW MEDS BY RX/DR IN RCRD: CPT | Mod: S$GLB,,, | Performed by: FAMILY MEDICINE

## 2017-04-04 PROCEDURE — 99999 PR PBB SHADOW E&M-EST. PATIENT-LVL III: CPT | Mod: PBBFAC,,, | Performed by: FAMILY MEDICINE

## 2017-04-04 PROCEDURE — 3079F DIAST BP 80-89 MM HG: CPT | Mod: S$GLB,,, | Performed by: FAMILY MEDICINE

## 2017-04-04 NOTE — PROGRESS NOTES
Subjective:       Patient ID: Dale Trujillo is a 41 y.o. female.    Chief Complaint: Foot Injury (x 3 days (right))    HPI Comments: Pt presents today for ankle pain right foot. Per pt, three days ago she tripped on the sidewalk and twisted her ankle. Per pt, had intense pain on lateral side of foot and along plantar surface bisi when weight bearing. Denies any ankle swelling or redness. No pain on mortise per pt. Pt took ASA x 2 but was icing ankle, elevating leg which has since helped per pt      Foot Injury    Pertinent negatives include no numbness.     Review of Systems   Constitutional: Negative.    Eyes: Negative.    Respiratory: Negative for cough, chest tightness and shortness of breath.    Cardiovascular: Negative for chest pain, palpitations and leg swelling.   Gastrointestinal: Negative.    Musculoskeletal: Positive for arthralgias and joint swelling. Negative for myalgias.   Skin: Negative.    Neurological: Negative for dizziness, weakness, light-headedness, numbness and headaches.       Objective:      Physical Exam   Constitutional: She is oriented to person, place, and time. She appears well-developed and well-nourished.   HENT:   Head: Normocephalic and atraumatic.   Eyes: Conjunctivae and EOM are normal. Pupils are equal, round, and reactive to light.   Neck: Normal range of motion. Neck supple. No thyromegaly present.   Cardiovascular: Normal rate, regular rhythm, normal heart sounds and intact distal pulses.    No murmur heard.  Pulmonary/Chest: Effort normal and breath sounds normal. No respiratory distress. She has no wheezes. She has no rales. She exhibits no tenderness.   Musculoskeletal: Normal range of motion. She exhibits tenderness (pos TTP along lateral right foot. FROM of foot and toes. pos DP pulses. No TTP along ankle joint). She exhibits no edema.   Lymphadenopathy:     She has no cervical adenopathy.   Neurological: She is alert and oriented to person, place, and time. She displays  normal reflexes. No cranial nerve deficit. Coordination normal.   Skin: Skin is warm. No erythema.   Psychiatric: She has a normal mood and affect. Her behavior is normal. Judgment and thought content normal.       Assessment:       1. Pain in right foot        Plan:       Xray to assess any fractures along metatarsal bones  Highly unlikely this is an ankle fracture, suspect more a sprain of lateral ligaments  Ice, rest, elevation, NSAIDS, supportive shoes.  If symptoms worsen, refer to foot and ankle  RTC prn symptoms  HTN controlled on meds

## 2017-04-04 NOTE — MR AVS SNAPSHOT
[facility]  [address]  [place] [address]  Phone: [phone]  Fax: [phone]                  Dale Trujillo   2017 7:40 AM   Office Visit    Description:  Female : 1975   Provider:  Sharon Restrepo MD   Department:  [facility]           Reason for Visit     Foot Injury           Diagnoses this Visit        Comments    Pain in right foot    -  Primary            To Do List           Future Appointments        Provider Department Dept Phone    2017 9:30 AM LKWH XR1 300 LB LIMIT [facility] [phone]    5/10/2017 8:30 AM AUDIOGRAM, AUDIO [facility] Audiolog [phone]    5/10/2017 9:00 AM Mir Garcia Jr., [facility] Audiology [phone]    5/10/2017 10:15 AM Chay Ray MD [facility] - Otorhinolaryngology [phone]      Goals (5 Years of Data)     None      [facility]     [facility] Nurse Care Line -  Assistance  Unless otherwise directed by your provider, please contact [facility], our nurse care line that is available for  assistance.     Registered nurses in the [facility] provide: appointment scheduling, clinical advisement, health education, and other advisory services.  Call: [phone] (toll free)               Medications           Message regarding Medications     Verify the changes and/or additions to your medication regime listed below are the same as discussed with your clinician today.  If any of these changes or additions are incorrect, please notify your healthcare provider.             Verify that the below list of medications is an accurate representation of the medications you are currently taking.  If none reported, the list may be blank. If incorrect, please contact your healthcare provider. Carry this list with you in case of emergency.           Current Medications     albuterol 90 mcg/actuation inhaler Inhale 2 puffs into the lungs every 4 (four)  hours as needed.    azelastine (ASTELIN) 137 mcg (0.1 %) nasal spray 1-2 sprays (137-274 mcg total) by Nasal route 2 (two) times daily as needed for Rhinitis.    cholecalciferol, vitamin D3, 1,000 unit capsule Take 1,000 Units by mouth once daily.    cranberry 1,000 mg Cap Take by mouth.    docusate sodium (COLACE) 50 MG capsule Take by mouth once daily.    ferrous gluconate (FERGON) 325 MG Tab Take 325 mg by mouth daily with breakfast.    fluticasone (FLONASE) 50 mcg/actuation nasal spray 2 sprays by Each Nare route once daily.    fluticasone-salmeterol 250-50 mcg/dose (ADVAIR) 250-50 mcg/dose diskus inhaler Inhale 1 puff into the lungs 2 (two) times daily.    hydrochlorothiazide (HYDRODIURIL) 25 MG tablet TAKE 1 TABLET (25 MG TOTAL) BY MOUTH ONCE DAILY.    levothyroxine (SYNTHROID) 200 MCG tablet TAKE 1 TABLET BY MOUTH EVERY DAY    loratadine (CLARITIN) 10 mg tablet Take 10 mg by mouth 2 (two) times daily.    metformin (GLUCOPHAGE) 500 MG tablet TAKE 1 TABLET BY MOUTH TWICE A DAY WITH MEALS    promethazine (PHENERGAN) 25 MG tablet Take 1 tablet (25 mg total) by mouth 3 (three) times daily as needed for Nausea.    sulfamethoxazole-trimethoprim 800-160mg (BACTRIM DS) 800-160 mg Tab TAKE 1 TABLET BY MOUTH NIGHTLY.           Clinical Reference Information           Your Vitals Were     BP Pulse Height Weight BMI    128/82 (BP Location: Left arm, Patient Position: Sitting, BP Method: Manual) 96 5' (1.524 m) 87.6 kg (193 lb 2 oz) 37.72 kg/m2      Blood Pressure          Most Recent Value    BP  128/82      Allergies as of 4/4/2017     Pollen Extracts      Immunizations Administered on Date of Encounter - 4/4/2017     None      Orders Placed During Today's Visit     Future Labs/Procedures Expected by Expires    X-Ray Foot Complete 3 view Right  4/4/2017 4/4/2018      Language Assistance Services     ATTENTION: Language assistance services are available, free of charge. Please call [phone].      ATENCIÓN: Alannah samuels  español, tiene a high disposición servicios gratuitos de asistencia lingüística. Aleksandar al [phone].     SUDHA Ý: N?u b?n nói Ti?ng Vi?t, có các d?ch v? h? tr? ngôn ng? mi?n phí dành cho b?n. G?i s? [phone].         [facility] complies with applicable Federal civil rights laws and does not discriminate on the basis of race, color, national origin, age, disability, or sex.

## 2017-04-04 NOTE — TELEPHONE ENCOUNTER
Sunday tripped and fell outside. Nothing is broken but foot hurts to walk. Has been icing and elevating.

## 2017-04-04 NOTE — TELEPHONE ENCOUNTER
"  Reason for Disposition   [1] Limp when walking AND [2] due to a twisted ankle or foot    Answer Assessment - Initial Assessment Questions  1. MECHANISM: "How did the injury happen?" (e.g., twisting injury, direct blow)       Tripped on sunday  2. ONSET: "When did the injury happen?" (Minutes or hours ago)       sunday  3. LOCATION: "Where is the injury located?"       Right foot outside edge of foot  4. APPEARANCE of INJURY: "What does the injury look like?"       Now swelling or bruising  5. WEIGHT-BEARING: "Can you put weight on that foot?" "Can you walk (four steps or more)?"        Walks but with pain  6. SIZE: For cuts, bruises, or swelling, ask: "How large is it?" (e.g., inches or centimeters;  entire joint)       None noted  7. PAIN: "Is there pain?" If so, ask: "How bad is the pain?"    (e.g., Scale 1-10; or mild, moderate, severe)      Rates pain 5/10 with ambulation, at rest feels fine  8. TETANUS: For any breaks in the skin, ask: "When was the last tetanus booster?"      Not applicable  9. OTHER SYMPTOMS: "Do you have any other symptoms?"       none  10. PREGNANCY: "Is there any chance you are pregnant?" "When was your last menstrual period?"        no    Protocols used: ST FOOT AND ANKLE INJURY-A-    "

## 2017-04-06 ENCOUNTER — PATIENT MESSAGE (OUTPATIENT)
Dept: SURGERY | Facility: CLINIC | Age: 42
End: 2017-04-06

## 2017-04-12 ENCOUNTER — TELEPHONE (OUTPATIENT)
Dept: SURGERY | Facility: CLINIC | Age: 42
End: 2017-04-12

## 2017-04-13 RX ORDER — METFORMIN HYDROCHLORIDE 500 MG/1
TABLET ORAL
Qty: 180 TABLET | Refills: 0 | Status: SHIPPED | OUTPATIENT
Start: 2017-04-13 | End: 2017-05-15 | Stop reason: SDUPTHER

## 2017-04-25 ENCOUNTER — PATIENT MESSAGE (OUTPATIENT)
Dept: SURGERY | Facility: CLINIC | Age: 42
End: 2017-04-25

## 2017-05-10 ENCOUNTER — OFFICE VISIT (OUTPATIENT)
Dept: OTOLARYNGOLOGY | Facility: CLINIC | Age: 42
End: 2017-05-10
Payer: COMMERCIAL

## 2017-05-10 ENCOUNTER — CLINICAL SUPPORT (OUTPATIENT)
Dept: AUDIOLOGY | Facility: CLINIC | Age: 42
End: 2017-05-10
Payer: COMMERCIAL

## 2017-05-10 VITALS — WEIGHT: 188.5 LBS | HEIGHT: 60 IN | BODY MASS INDEX: 37.01 KG/M2

## 2017-05-10 DIAGNOSIS — H83.09: Primary | ICD-10-CM

## 2017-05-10 DIAGNOSIS — H81.8X9 OTHER DISORDERS OF VESTIBULAR FUNCTION, UNSPECIFIED EAR: Primary | ICD-10-CM

## 2017-05-10 DIAGNOSIS — H90.3 SENSORY HEARING LOSS, BILATERAL: Primary | ICD-10-CM

## 2017-05-10 PROCEDURE — 99999 PR PBB SHADOW E&M-EST. PATIENT-LVL IV: CPT | Mod: PBBFAC,,, | Performed by: OTOLARYNGOLOGY

## 2017-05-10 PROCEDURE — 92557 COMPREHENSIVE HEARING TEST: CPT | Mod: S$GLB,,, | Performed by: AUDIOLOGIST

## 2017-05-10 PROCEDURE — 1160F RVW MEDS BY RX/DR IN RCRD: CPT | Mod: S$GLB,,, | Performed by: OTOLARYNGOLOGY

## 2017-05-10 PROCEDURE — 92537 CALORIC VSTBLR TEST W/REC: CPT | Mod: S$GLB,,, | Performed by: AUDIOLOGIST

## 2017-05-10 PROCEDURE — 92540 BASIC VESTIBULAR EVALUATION: CPT | Mod: S$GLB,,, | Performed by: AUDIOLOGIST

## 2017-05-10 PROCEDURE — 92550 TYMPANOMETRY & REFLEX THRESH: CPT | Mod: S$GLB,,, | Performed by: AUDIOLOGIST

## 2017-05-10 PROCEDURE — 99214 OFFICE O/P EST MOD 30 MIN: CPT | Mod: S$GLB,,, | Performed by: OTOLARYNGOLOGY

## 2017-05-10 PROCEDURE — 99999 PR PBB SHADOW E&M-EST. PATIENT-LVL I: CPT | Mod: PBBFAC,,, | Performed by: AUDIOLOGIST

## 2017-05-10 RX ORDER — MECLIZINE HYDROCHLORIDE 25 MG/1
25 TABLET ORAL 3 TIMES DAILY PRN
Qty: 45 TABLET | Refills: 1 | Status: SHIPPED | OUTPATIENT
Start: 2017-05-10 | End: 2017-07-31 | Stop reason: SDUPTHER

## 2017-05-10 NOTE — LETTER
May 10, 2017      DEJON Low III, MD  [address]  [facility] [address]           [facility]  [address]  [facility] [address]  Phone: [phone]  Fax: [phone]          Patient: Dale Trujillo   MR Number: [id number]   YOB: 1975   Date of Visit: 5/10/2017       Dear Dr. DEJON Low III:    Thank you for referring Dale Trujillo to me for evaluation. Attached you will find relevant portions of my assessment and plan of care.    If you have questions, please do not hesitate to call me. I look forward to following Dale Trujillo along with you.    Sincerely,    Henok Fierro MD    Enclosure  CC:  No Recipients    If you would like to receive this communication electronically, please contact [email or web address] or [phone] to request more information on [facility] access.    For providers and/or their staff who would like to refer a patient to [facility], please contact us through our one-stop-shop provider referral line, [facility], at [phone].    If you feel you have received this communication in error or would no longer like to receive these types of communications, please e-mail [email or web address]

## 2017-05-10 NOTE — PROGRESS NOTES
VNG/Postuography Evaluation    Referring physician:  Dr. Ray    41 y.o. female complains of vertigo, imbalance and nausea.  Symptoms are provoked initially by lying down on her left side in bed.  Since that initial episode the only other thing that seems to provoke imbalance is arising quickly from a chair.  Her symptoms began 2 months ago.      Gaze nystagmus  was absent.  Oculomotor function was normal.  Spontaneous nystagmus was absent  The head-hanging left Hallpike was negative.  The head-hanging right Hallpike was negative.  Unilateral weakness: 0% ( right)  Directional preponderance 5% (r beating)  RC: 9 d/s  LC: 10 d/s  RW: 13 d/s  LW: 12 d/s  Fixation suppression was normal.    Impression: Normal VNG; no evidence of inner ear asymmetry nor dysfunction including BPPV

## 2017-05-10 NOTE — PROGRESS NOTES
Subjective:       Patient ID: Dale Trujillo is a 41 y.o. female.    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 No ear symptoms then or now. She states she still has this off balance sense when she stands up which lasts for 1-2 seconds, she has the maybe one time per week now. Her symptoms have gradually improved. No neurologic disease. No history of migraines herself or in her family. Only quick movements tend to make it worse, and it is very minimal at this time. She started astelin from her allergist thinking it was part of her allergies and it made her worse.     Dizziness:   Chronicity:  New  Onset:  More than 1 month ago  Progression since onset:  Gradually improving  Frequency:  Every few days  Severity:  Initially severe, but improved  Duration:  Very brief  Dizziness characteristics:  Sensation of movement and off-balance  Initial Spell Date and Length:  45 seconds  Frequency of Spells:  Weekly  Duration of Spells:  2 secondsno hearing loss, no ear congestion, no ear pain, no fever, no headaches, no tinnitus, no nausea, no vomiting, no diaphoresis, no aural fullness, no visual disturbances, no light-headedness, no syncope, no palpitations, no panic, no facial weakness, no slurred speech, no numbness in extremities and no chest pain.  Aggravated by:  Position changes  Risk factors:  Family history of inner ear  Treatments tried: astelin.  Improvements on treatment:  Significant   PMH includes: balance testing.no strokes, no neurologic disease, no ear trauma, no ear infections, no MRI head and no CT head.    Review of Systems   Constitutional: Negative.  Negative for diaphoresis and fever.   HENT: Negative for ear pain, hearing loss, postnasal drip, sore throat, tinnitus and voice change.    Cardiovascular: Negative for chest pain, palpitations and syncope.   Gastrointestinal: Negative for  nausea and vomiting.   Neurological: Positive for dizziness. Negative for facial asymmetry, light-headedness and headaches.       Objective:      Physical Exam   Constitutional: She is oriented to person, place, and time. She appears well-developed and well-nourished.   HENT:   Head: Normocephalic and atraumatic.   Right Ear: Hearing, tympanic membrane, external ear and ear canal normal.   Left Ear: Hearing, tympanic membrane, external ear and ear canal normal.   Eyes: EOM are normal.   Cardiovascular: Normal rate.    Pulmonary/Chest: Effort normal.   Neurological: She is alert and oriented to person, place, and time. She is not disoriented. No cranial nerve deficit. She displays a negative Romberg sign.   Nursing note and vitals reviewed.          41 y.o. female complains of vertigo, imbalance and nausea. Symptoms are provoked initially by lying down on her left side in bed. Since that initial episode the only other thing that seems to provoke imbalance is arising quickly from a chair. Her symptoms began 2 months ago.      Gaze nystagmus was absent.  Oculomotor function was normal.  Spontaneous nystagmus was absent  The head-hanging left Hallpike was negative.  The head-hanging right Hallpike was negative.  Unilateral weakness: 0% ( right)  Directional preponderance 5% (r beating)  RC: 9 d/s  LC: 10 d/s  RW: 13 d/s  LW: 12 d/s  Fixation suppression was normal.     Impression: Normal VNG; no evidence of inner ear asymmetry nor dysfunction including BPPV    Assessment:       1. Vestibulitis of ear, unspecified laterality        Plan:       Pt story c/w a vestibulitis back in march that has improved with time. She is minimally affected by this now and has a completely normal VNG. Pt may f/u JONATHAN Hunter was seen today for dizziness.    Diagnoses and all orders for this visit:    Vestibulitis of ear, unspecified laterality  -     Ambulatory referral to Audiology  -     Ambulatory referral to Audiology    Other  orders  -     meclizine (ANTIVERT) 25 mg tablet; Take 1 tablet (25 mg total) by mouth 3 (three) times daily as needed.

## 2017-05-15 RX ORDER — METFORMIN HYDROCHLORIDE 500 MG/1
TABLET ORAL
Qty: 180 TABLET | Refills: 0 | Status: SHIPPED | OUTPATIENT
Start: 2017-05-15 | End: 2017-07-31 | Stop reason: SDUPTHER

## 2017-05-15 RX ORDER — LEVOTHYROXINE SODIUM 200 UG/1
TABLET ORAL
Qty: 90 TABLET | Refills: 0 | Status: SHIPPED | OUTPATIENT
Start: 2017-05-15 | End: 2017-07-31 | Stop reason: SDUPTHER

## 2017-05-22 RX ORDER — SULFAMETHOXAZOLE AND TRIMETHOPRIM 800; 160 MG/1; MG/1
TABLET ORAL
Qty: 90 TABLET | Refills: 0 | Status: SHIPPED | OUTPATIENT
Start: 2017-05-22 | End: 2017-08-28 | Stop reason: SDUPTHER

## 2017-06-01 RX ORDER — HYDROCHLOROTHIAZIDE 25 MG/1
TABLET ORAL
Qty: 90 TABLET | Refills: 1 | Status: SHIPPED | OUTPATIENT
Start: 2017-06-01 | End: 2017-07-05

## 2017-06-19 ENCOUNTER — OFFICE VISIT (OUTPATIENT)
Dept: INTERNAL MEDICINE | Facility: CLINIC | Age: 42
End: 2017-06-19
Attending: FAMILY MEDICINE
Payer: COMMERCIAL

## 2017-06-19 VITALS
HEIGHT: 60 IN | WEIGHT: 189.13 LBS | HEART RATE: 101 BPM | DIASTOLIC BLOOD PRESSURE: 86 MMHG | BODY MASS INDEX: 37.13 KG/M2 | SYSTOLIC BLOOD PRESSURE: 136 MMHG

## 2017-06-19 DIAGNOSIS — I10 ESSENTIAL HYPERTENSION: ICD-10-CM

## 2017-06-19 DIAGNOSIS — E11.9 DIABETES MELLITUS DUE TO INSULIN RECEPTOR ANTIBODIES: Primary | ICD-10-CM

## 2017-06-19 PROCEDURE — 3044F HG A1C LEVEL LT 7.0%: CPT | Mod: S$GLB,,, | Performed by: FAMILY MEDICINE

## 2017-06-19 PROCEDURE — 99213 OFFICE O/P EST LOW 20 MIN: CPT | Mod: S$GLB,,, | Performed by: FAMILY MEDICINE

## 2017-06-19 PROCEDURE — 99999 PR PBB SHADOW E&M-EST. PATIENT-LVL III: CPT | Mod: PBBFAC,,, | Performed by: FAMILY MEDICINE

## 2017-06-19 RX ORDER — NORETHINDRONE ACETATE AND ETHINYL ESTRADIOL 1MG-20(21)
KIT ORAL
COMMUNITY
Start: 2017-05-27 | End: 2017-12-02 | Stop reason: SDUPTHER

## 2017-06-19 NOTE — PROGRESS NOTES
Subjective:       Patient ID: Dale Trujillo is a 41 y.o. female.    Chief Complaint: Hypertension and Follow-up    Pt presents today for f/u before she leaves to move back home. Pt very happy to be going back to friends, family and good [employer].  Overall, doing well. Will need med records and will request them accordingly.  Also states that she wants to have repeat labs prior to departure      Hypertension   Pertinent negatives include no chest pain, headaches, neck pain, palpitations or shortness of breath.     Review of Systems   Constitutional: Negative for activity change and unexpected weight change.   HENT: Negative for hearing loss, rhinorrhea and trouble swallowing.    Eyes: Negative.  Negative for discharge and visual disturbance.   Respiratory: Negative for cough, chest tightness, shortness of breath and wheezing.    Cardiovascular: Negative for chest pain, palpitations and leg swelling.   Gastrointestinal: Negative.  Negative for blood in stool, constipation, diarrhea and vomiting.   Endocrine: Negative for polydipsia and polyuria.   Genitourinary: Negative for difficulty urinating, dysuria, hematuria and menstrual problem.   Musculoskeletal: Negative.  Negative for arthralgias, joint swelling and neck pain.   Skin: Negative.    Neurological: Negative for dizziness, weakness, light-headedness and headaches.   Psychiatric/Behavioral: Negative for confusion and dysphoric mood.       Objective:      Physical Exam   Constitutional: She is oriented to person, place, and time. She appears well-developed and well-nourished.   HENT:   Head: Normocephalic and atraumatic.   Eyes: Conjunctivae and EOM are normal. Pupils are equal, round, and reactive to light.   Neck: Normal range of motion. Neck supple. No thyromegaly present.   Cardiovascular: Normal rate, regular rhythm, normal heart sounds and intact distal pulses.    No murmur heard.  Pulmonary/Chest: Effort normal and breath sounds normal. No respiratory  distress. She has no wheezes. She has no rales. She exhibits no tenderness.   Musculoskeletal: Normal range of motion. She exhibits no edema.   Lymphadenopathy:     She has no cervical adenopathy.   Neurological: She is alert and oriented to person, place, and time.   Skin: Skin is warm. No erythema.   Psychiatric: She has a normal mood and affect. Her behavior is normal. Judgment and thought content normal.       Assessment:       1. 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 Essential hypertension        Plan:   HTN: controlled in hospital but elevated with home readings. Would advise seeing a doctor when she arrives back home to assess BP's and to see if she needs another med added to her regiment  DMT2: borderline controlled. Pt to watch diet and exercise along with lifestyle modifications    Labs pending  Immunizations are UTD  RTC prn symptoms or prn  Pt is wished a safe journey home and good luck in future endeavors

## 2017-06-23 ENCOUNTER — LAB VISIT (OUTPATIENT)
Dept: LAB | Facility: HOSPITAL | Age: 42
End: 2017-06-23
Payer: COMMERCIAL

## 2017-06-23 ENCOUNTER — OFFICE VISIT (OUTPATIENT)
Dept: ALLERGY | Facility: CLINIC | Age: 42
End: 2017-06-23
Payer: COMMERCIAL

## 2017-06-23 VITALS
BODY MASS INDEX: 37.13 KG/M2 | SYSTOLIC BLOOD PRESSURE: 138 MMHG | DIASTOLIC BLOOD PRESSURE: 74 MMHG | WEIGHT: 189.13 LBS | HEART RATE: 84 BPM | HEIGHT: 60 IN

## 2017-06-23 DIAGNOSIS — J45.909 UNCOMPLICATED ASTHMA, UNSPECIFIED ASTHMA SEVERITY: Primary | ICD-10-CM

## 2017-06-23 DIAGNOSIS — J30.2 SEASONAL ALLERGIC RHINITIS, UNSPECIFIED ALLERGIC RHINITIS TRIGGER: ICD-10-CM

## 2017-06-23 DIAGNOSIS — H10.13 ALLERGIC CONJUNCTIVITIS OF BOTH EYES: ICD-10-CM

## 2017-06-23 DIAGNOSIS — E11.9 DIABETES MELLITUS DUE TO INSULIN RECEPTOR ANTIBODIES: ICD-10-CM

## 2017-06-23 DIAGNOSIS — I10 ESSENTIAL HYPERTENSION: ICD-10-CM

## 2017-06-23 LAB
ALBUMIN SERPL BCP-MCNC: 3.2 G/DL
ALP SERPL-CCNC: 121 U/L
ALT SERPL W/O P-5'-P-CCNC: 12 U/L
ANION GAP SERPL CALC-SCNC: 12 MMOL/L
AST SERPL-CCNC: 12 U/L
BASOPHILS # BLD AUTO: 0.02 K/UL
BASOPHILS NFR BLD: 0.2 %
BILIRUB SERPL-MCNC: 0.3 MG/DL
BUN SERPL-MCNC: 45 MG/DL
CALCIUM SERPL-MCNC: 9.2 MG/DL
CHLORIDE SERPL-SCNC: 113 MMOL/L
CHOLEST/HDLC SERPL: 4 {RATIO}
CO2 SERPL-SCNC: 13 MMOL/L
CREAT SERPL-MCNC: 2.1 MG/DL
DIFFERENTIAL METHOD: ABNORMAL
EOSINOPHIL # BLD AUTO: 0.1 K/UL
EOSINOPHIL NFR BLD: 1 %
ERYTHROCYTE [DISTWIDTH] IN BLOOD BY AUTOMATED COUNT: 15.3 %
EST. GFR  (AFRICAN AMERICAN): 33 ML/MIN/1.73 M^2
EST. GFR  (NON AFRICAN AMERICAN): 28.6 ML/MIN/1.73 M^2
ESTIMATED AVG GLUCOSE: 134 MG/DL
GLUCOSE SERPL-MCNC: 135 MG/DL
HBA1C MFR BLD HPLC: 6.3 %
HCT VFR BLD AUTO: 35.2 %
HDL/CHOLESTEROL RATIO: 25.3 %
HDLC SERPL-MCNC: 170 MG/DL
HDLC SERPL-MCNC: 43 MG/DL
HGB BLD-MCNC: 11.7 G/DL
LDLC SERPL CALC-MCNC: 112.2 MG/DL
LYMPHOCYTES # BLD AUTO: 2.2 K/UL
LYMPHOCYTES NFR BLD: 17.1 %
MCH RBC QN AUTO: 28.4 PG
MCHC RBC AUTO-ENTMCNC: 33.2 %
MCV RBC AUTO: 85 FL
MONOCYTES # BLD AUTO: 0.8 K/UL
MONOCYTES NFR BLD: 6.2 %
NEUTROPHILS # BLD AUTO: 9.5 K/UL
NEUTROPHILS NFR BLD: 73.6 %
NONHDLC SERPL-MCNC: 127 MG/DL
PLATELET # BLD AUTO: 387 K/UL
PMV BLD AUTO: 8.6 FL
POTASSIUM SERPL-SCNC: 2.8 MMOL/L
PROT SERPL-MCNC: 7.4 G/DL
RBC # BLD AUTO: 4.12 M/UL
SODIUM SERPL-SCNC: 138 MMOL/L
T4 FREE SERPL-MCNC: 1.46 NG/DL
TRIGL SERPL-MCNC: 74 MG/DL
TSH SERPL DL<=0.005 MIU/L-ACNC: 0.16 UIU/ML
WBC # BLD AUTO: 12.83 K/UL

## 2017-06-23 PROCEDURE — 83036 HEMOGLOBIN GLYCOSYLATED A1C: CPT

## 2017-06-23 PROCEDURE — 99214 OFFICE O/P EST MOD 30 MIN: CPT | Mod: S$GLB,,, | Performed by: ALLERGY & IMMUNOLOGY

## 2017-06-23 PROCEDURE — 36415 COLL VENOUS BLD VENIPUNCTURE: CPT

## 2017-06-23 PROCEDURE — 3044F HG A1C LEVEL LT 7.0%: CPT | Mod: S$GLB,,, | Performed by: ALLERGY & IMMUNOLOGY

## 2017-06-23 PROCEDURE — 84439 ASSAY OF FREE THYROXINE: CPT

## 2017-06-23 PROCEDURE — 80061 LIPID PANEL: CPT

## 2017-06-23 PROCEDURE — 80053 COMPREHEN METABOLIC PANEL: CPT

## 2017-06-23 PROCEDURE — 99999 PR PBB SHADOW E&M-EST. PATIENT-LVL III: CPT | Mod: PBBFAC,,, | Performed by: ALLERGY & IMMUNOLOGY

## 2017-06-23 PROCEDURE — 84443 ASSAY THYROID STIM HORMONE: CPT

## 2017-06-23 PROCEDURE — 85025 COMPLETE CBC W/AUTO DIFF WBC: CPT

## 2017-06-23 RX ORDER — FLUTICASONE PROPIONATE AND SALMETEROL 250; 50 UG/1; UG/1
1 POWDER RESPIRATORY (INHALATION) 2 TIMES DAILY
Qty: 1 EACH | Refills: 5 | Status: SHIPPED | OUTPATIENT
Start: 2017-06-23 | End: 2017-07-31 | Stop reason: SDUPTHER

## 2017-06-23 RX ORDER — ALBUTEROL SULFATE 90 UG/1
2 AEROSOL, METERED RESPIRATORY (INHALATION) EVERY 4 HOURS PRN
Qty: 18 G | Refills: 5 | Status: SHIPPED | OUTPATIENT
Start: 2017-06-23 | End: 2017-07-31 | Stop reason: SDUPTHER

## 2017-06-23 NOTE — PROGRESS NOTES
Dale Trujillo returns to clinic today for continued evaluation of allergic rhinitis and asthma. She is here alone. She was last seen March 20, 2017.    Since her last visit, she has done well.  Her rhinitis has been controlled on loratadine and Flonase daily.    Her asthma has been controlled. She has not been taking Advair. She has not needed any albuterol since her last visit.    She usually does much better during the summer.    She is moving back home to the [facility].  She will be living in house with carpeting temporarily.    Her dizziness resolved. She saw Dr. Miranda who thought she had a vestibulitis that resolved.    She continues to take hydrochlorothiazide for hypertension.    She continues to take Glucophage.    On Monday she developed right knee pain. It is more painful when she bears weight on it. She has been taking Motrin with partial relief. She does not remember any injury to it.    [facility] PEQ ALLERGY QUESTIONNAIRE SHORT 6/23/2017   Are you taking any new medications since your last visit? No   Constitution: No symptoms   Head or facial pain: No symptoms   Eyes: No symptoms   Ears: No symptoms   Nose: Blocked nose   Throat: No symptoms   Sinuses: No symptoms   Lungs: Asthma, Use of inhalers   Skin: No symptoms   Cardiovascular: Leg swelling, High blood pressue   Gastrointestinal: No symptoms   Genital/ urinary No symptoms   Musculoskeletal: Joint pain, Joint swelling   Neurologic: Headaches   Endocrine: Diabetes   Hematologic: No symptoms     Physical Examination:  General: Well-developed, well-nourished, no acute distress.  Obese.  Head: No sinus tenderness.  Eyes: Conjunctivae:  No bulbar or palpebral conjunctival injection.  Ears: EAC's clear.  TM's clear.  No pre-auricular nodes.  Nose: Nasal Mucosa:  Pink.  Septum: No apparent deviation.  Turbinates:  No significant edema.  Polyps/Mass:  None visible.  Teeth/Gums:  No bleeding noted.  Oropharynx: No exudates.  Neck: Supple without thyromegaly.  No cervical lymphadenopathy.    Respiratory/Chest: Effort: Good.  Auscultation:  Clear bilaterally.  Skin: Good turgor.  No urticaria or angioedema.  Neuro/Psych: Oriented x 3.    Laboratory 11/7/2013:  IgE level: Less than 35.  Class II:  DM.    Spirometry 11/7/2013:  Normal.    Outside medical records were reviewed and were scanned into Epic.    Assessment:  1. Allergic rhinitis.  2. Allergic asthma.  3. Mild GERD, controlled.  4. Right knee pain of uncertain etiology.  5. Hypothyroidism on replacement  6. Anemia, iron deficiency.  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  8. Type 2 diabetes on metformin.  9. Hypertension on hydrochlorothiazide.    Recommendations:  1. Continue house dust mite avoidance.  2. Continue Fluticasone 2 sprays each nostril daily.  3. Claritin as needed.  4. Albuterol as needed.  5. Restart Symbicort if needed.  6. History of right knee pain continues, orthopedics evaluation.

## 2017-06-26 ENCOUNTER — OFFICE VISIT (OUTPATIENT)
Dept: ORTHOPEDICS | Facility: CLINIC | Age: 42
End: 2017-06-26
Payer: COMMERCIAL

## 2017-06-26 ENCOUNTER — HOSPITAL ENCOUNTER (OUTPATIENT)
Dept: RADIOLOGY | Facility: HOSPITAL | Age: 42
Discharge: HOME | End: 2017-06-26
Attending: ORTHOPAEDIC SURGERY
Payer: COMMERCIAL

## 2017-06-26 VITALS
HEART RATE: 85 BPM | WEIGHT: 188.94 LBS | HEIGHT: 60 IN | SYSTOLIC BLOOD PRESSURE: 124 MMHG | DIASTOLIC BLOOD PRESSURE: 77 MMHG | BODY MASS INDEX: 37.09 KG/M2

## 2017-06-26 DIAGNOSIS — M25.561 ACUTE PAIN OF RIGHT KNEE: ICD-10-CM

## 2017-06-26 DIAGNOSIS — M25.561 ACUTE PAIN OF RIGHT KNEE: Primary | ICD-10-CM

## 2017-06-26 PROCEDURE — 99999 PR PBB SHADOW E&M-EST. PATIENT-LVL IV: CPT | Mod: PBBFAC,,, | Performed by: PHYSICIAN ASSISTANT

## 2017-06-26 PROCEDURE — 73560 X-RAY EXAM OF KNEE 1 OR 2: CPT | Mod: TC,LT

## 2017-06-26 PROCEDURE — 73562 X-RAY EXAM OF KNEE 3: CPT | Mod: 26,RT,, | Performed by: RADIOLOGY

## 2017-06-26 PROCEDURE — 99203 OFFICE O/P NEW LOW 30 MIN: CPT | Mod: S$GLB,,, | Performed by: PHYSICIAN ASSISTANT

## 2017-06-26 PROCEDURE — 73560 X-RAY EXAM OF KNEE 1 OR 2: CPT | Mod: 26,59,LT, | Performed by: RADIOLOGY

## 2017-06-26 RX ORDER — MELOXICAM 15 MG/1
15 TABLET ORAL DAILY
Qty: 30 TABLET | Refills: 2 | Status: SHIPPED | OUTPATIENT
Start: 2017-06-26 | End: 2017-07-26

## 2017-06-26 NOTE — PROGRESS NOTES
SUBJECTIVE:     Chief Complaint & History of Present Illness:  Dale Trujillo is a New patient 41 y.o. female who is seen here today with a complaint of    Chief Complaint   Patient presents with    Right Knee - Pain    .  Reports developing pain and tenderness in the anterior aspect of her right knee approximate 2 weeks ago after a fall landing on the anterior aspect of her knee.  She denied having locking catching or giving way.  There was some moderate swelling immediately after the fall but has since resolved.  She has taken over-the-counter NSAIDs with partial relief of her symptoms  On a scale of 1-10, with 10 being worst pain imaginable, he rates this pain as 1 on good days and 4 on bad days.  she describes the pain as tender and sore.    Review of patient's allergies indicates:   Allergen Reactions    Pollen extracts          Current Outpatient Prescriptions   Medication Sig Dispense Refill    albuterol 90 mcg/actuation inhaler Inhale 2 puffs into the lungs every 4 (four) hours as needed. 18 g 5    BLISOVI FE 1/20, 28, 1 mg-20 mcg (21)/75 mg (7) per tablet       cholecalciferol, vitamin D3, 1,000 unit capsule Take 1,000 Units by mouth once daily.      cranberry 1,000 mg Cap Take by mouth.      docusate sodium (COLACE) 50 MG capsule Take by mouth once daily.      ferrous gluconate (FERGON) 325 MG Tab Take 325 mg by mouth daily with breakfast.      fluticasone (FLONASE) 50 mcg/actuation nasal spray 2 sprays by Each Nare route once daily. (Patient taking differently: 2 sprays by Each Nare route 2 (two) times daily. ) 16 Bottle 11    fluticasone-salmeterol 250-50 mcg/dose (ADVAIR) 250-50 mcg/dose diskus inhaler Inhale 1 puff into the lungs 2 (two) times daily. 1 each 5    hydrochlorothiazide (HYDRODIURIL) 25 MG tablet TAKE 1 TABLET (25 MG TOTAL) BY MOUTH ONCE DAILY. 90 tablet 1    levothyroxine (SYNTHROID) 200 MCG tablet TAKE 1 TABLET BY MOUTH EVERY DAY 90 tablet 0    loratadine (CLARITIN) 10 mg  tablet Take 10 mg by mouth 2 (two) times daily.      metformin (GLUCOPHAGE) 500 MG tablet TAKE 1 TABLET BY MOUTH TWICE A DAY WITH MEALS 180 tablet 0    promethazine (PHENERGAN) 25 MG tablet Take 1 tablet (25 mg total) by mouth 3 (three) times daily as needed for Nausea. 30 tablet 0    sulfamethoxazole-trimethoprim 800-160mg (BACTRIM DS) 800-160 mg Tab TAKE 1 TABLET BY MOUTH NIGHTLY 90 tablet 0    meclizine (ANTIVERT) 25 mg tablet Take 1 tablet (25 mg total) by mouth 3 (three) times daily as needed. 45 tablet 1    meloxicam (MOBIC) 15 MG tablet Take 1 tablet (15 mg total) by mouth once daily. 30 tablet 2     No current facility-administered medications for this visit.        Past Medical History:   Diagnosis Date    Allergy     Anemia     Asthma     Chronic kidney disease     Renal agenesis unilaterally    Congenital hypoplasia of bladder     Thyroid disease        Past Surgical History:   Procedure Laterality Date    URETERAL REIMPLANTATION OF TRANSPLANTED KIDNEY         Vital Signs (Most Recent)  Vitals:    06/26/17 0810   BP: 124/77   Pulse: 85           Review of Systems:  ROS:  Constitutional: no fever or chills  Eyes: no visual changes  ENT: no nasal congestion or sore throat  Respiratory: no cough or shortness of breath, Positive history of asthma  Cardiovascular: no chest pain or palpitations  Gastrointestinal: no nausea or vomiting, tolerating diet  Genitourinary: no hematuria or dysuria  Integument/Breast: no rash or pruritis  Hematologic/Lymphatic: no easy bruising or lymphadenopathy  Musculoskeletal: no arthralgias or myalgias  Neurological: no seizures or tremors  Behavioral/Psych: no auditory or visual hallucinations  Endocrine: no heat or cold intolerance, Positive for diabetes                OBJECTIVE:     PHYSICAL EXAM:  Height: 5' (152.4 cm) Weight: 85.7 kg (188 lb 15 oz), General Appearance: Well nourished, well developed, in no acute distress.  Neurological: Mood & affect are  normal.  right Knee Exam:  Knee Range of Motion:0-120 degrees flexion   Effusion:none  Condition of skin:intact  Location of tenderness:Patellar tendon   Strength:5 of 5  Stability:  Lachman: stable, LCL: stable, MCL: stable, PCL: stable and posteromedial (dial): stable  Varus /Valgus stress:  normal  Genevieve:   negative/negative    left Knee Exam:  Knee Range of Motion:0-120 degrees flexion   Effusion:none  Condition of skin:intact  Location of tenderness:None   Strength:5 of 5  Stability:  stable to testing  Varus /Valgus stress:  normal  Genevieve:   negative/negative      Hip Examination:  normal    RADIOGRAPHS:  X-rays taken today films reviewed by me demonstrate no evidence of fracture dislocation or about the joint spaces are well-maintained no advanced degenerative joint disease osteophytic spurring or sclerotic changes noted    ASSESSMENT/PLAN:     Plan: We discussed with the patient at length all the different treatment options available for  the knee including anti-inflammatories, acetaminophen, rest, ice, knee strengthening exercise, occasional cortisone injections for temporary relief, Viscosupplimentation injections, arthroscopic debridement osteotomy, and finally knee arthroplasty.   Patient like to begin with conservative treatment  Ice to affected area twice a day  Meloxicam 15 mg daily with food ×7 days followed by when necessary  If symptoms not resolve 7-10 days follow-up in clinic consider injection therapy sooner symptoms dictate

## 2017-06-26 NOTE — LETTER
June 26, 2017      DEJON Low III, MD  [address]  [place] [address]           [facility] - Orthopedics  [address]  [place] [address]  Phone: [phone]          Patient: Dale Trujillo   MR Number: [id number]   YOB: 1975   Date of Visit: 6/26/2017       Dear Dr. DEJON Low III:    Thank you for referring Dale Trujillo to me for evaluation. Attached you will find relevant portions of my assessment and plan of care.    If you have questions, please do not hesitate to call me. I look forward to following Dale Trujillo along with you.    Sincerely,    Kwame Palma PA-C    Enclosure  CC:  No Recipients    If you would like to receive this communication electronically, please contact [email or web address] or [phone] to request more information on [facility] access.    For providers and/or their staff who would like to refer a patient to [facility], please contact us through our one-stop-shop provider referral line, [facility] , at [phone].    If you feel you have received this communication in error or would no longer like to receive these types of communications, please e-mail [email or web address]

## 2017-07-05 ENCOUNTER — PATIENT MESSAGE (OUTPATIENT)
Dept: INTERNAL MEDICINE | Facility: CLINIC | Age: 42
End: 2017-07-05

## 2017-07-05 ENCOUNTER — TELEPHONE (OUTPATIENT)
Dept: INTERNAL MEDICINE | Facility: CLINIC | Age: 42
End: 2017-07-05

## 2017-07-05 RX ORDER — AMLODIPINE BESYLATE 2.5 MG/1
2.5 TABLET ORAL DAILY
Qty: 90 TABLET | Refills: 1 | Status: SHIPPED | OUTPATIENT
Start: 2017-07-05 | End: 2017-07-31 | Stop reason: SDUPTHER

## 2017-07-05 NOTE — TELEPHONE ENCOUNTER
----- Message from Anna Justin sent at 7/5/2017  4:18 PM CDT -----  Contact: PT  PT missed call from Nurse Rizo  PT would like a callback if possible.    PT callback: [phone]

## 2017-07-05 NOTE — TELEPHONE ENCOUNTER
Potassium levels discussed with pt and will stop HCTZ and repeat potassium and TSH.  Sallie, please make her an appt to see a nurse with her new BP med for a reading as well as a same day lab appt for NONFASTING labs.  Thanks,  PV

## 2017-07-11 ENCOUNTER — TELEPHONE (OUTPATIENT)
Dept: INTERNAL MEDICINE | Facility: CLINIC | Age: 42
End: 2017-07-11

## 2017-07-11 DIAGNOSIS — R89.9 ABNORMAL LABORATORY TEST: Primary | ICD-10-CM

## 2017-07-13 ENCOUNTER — CLINICAL SUPPORT (OUTPATIENT)
Dept: INTERNAL MEDICINE | Facility: CLINIC | Age: 42
End: 2017-07-13
Payer: COMMERCIAL

## 2017-07-13 ENCOUNTER — TELEPHONE (OUTPATIENT)
Dept: INTERNAL MEDICINE | Facility: CLINIC | Age: 42
End: 2017-07-13

## 2017-07-13 ENCOUNTER — LAB VISIT (OUTPATIENT)
Dept: LAB | Facility: OTHER | Age: 42
End: 2017-07-13
Attending: FAMILY MEDICINE
Payer: COMMERCIAL

## 2017-07-13 VITALS — SYSTOLIC BLOOD PRESSURE: 120 MMHG | OXYGEN SATURATION: 99 % | HEART RATE: 90 BPM | DIASTOLIC BLOOD PRESSURE: 70 MMHG

## 2017-07-13 DIAGNOSIS — R89.9 ABNORMAL LABORATORY TEST: ICD-10-CM

## 2017-07-13 LAB
POTASSIUM SERPL-SCNC: 3.4 MMOL/L
T4 FREE SERPL-MCNC: 1.11 NG/DL
TSH SERPL DL<=0.005 MIU/L-ACNC: 0.31 UIU/ML

## 2017-07-13 PROCEDURE — 36415 COLL VENOUS BLD VENIPUNCTURE: CPT

## 2017-07-13 PROCEDURE — 99999 PR PBB SHADOW E&M-EST. PATIENT-LVL I: CPT | Mod: PBBFAC,,,

## 2017-07-13 PROCEDURE — 84439 ASSAY OF FREE THYROXINE: CPT

## 2017-07-13 PROCEDURE — 84443 ASSAY THYROID STIM HORMONE: CPT

## 2017-07-13 PROCEDURE — 84132 ASSAY OF SERUM POTASSIUM: CPT

## 2017-07-13 NOTE — PROGRESS NOTES
Dale Trujillo 41 y.o. female is here today for Blood Pressure check.   History of HTN yes.    Review of patient's allergies indicates:   Allergen Reactions    Pollen extracts      Creatinine   Date Value Ref Range Status   06/23/2017 2.1 (H) 0.5 - 1.4 mg/dL Final     Sodium   Date Value Ref Range Status   06/23/2017 138 136 - 145 mmol/L Final     Potassium   Date Value Ref Range Status   06/23/2017 2.8 (L) 3.5 - 5.1 mmol/L Final   ]  Patient verifies taking blood pressure medications on a regular bases at the same time of the day.     Current Outpatient Prescriptions:     albuterol 90 mcg/actuation inhaler, Inhale 2 puffs into the lungs every 4 (four) hours as needed., Disp: 18 g, Rfl: 5    amlodipine (NORVASC) 2.5 MG tablet, Take 1 tablet (2.5 mg total) by mouth once daily., Disp: 90 tablet, Rfl: 1    BLISOVI FE 1/20, 28, 1 mg-20 mcg (21)/75 mg (7) per tablet, , Disp: , Rfl:     cholecalciferol, vitamin D3, 1,000 unit capsule, Take 1,000 Units by mouth once daily., Disp: , Rfl:     cranberry 1,000 mg Cap, Take by mouth., Disp: , Rfl:     docusate sodium (COLACE) 50 MG capsule, Take by mouth once daily., Disp: , Rfl:     ferrous gluconate (FERGON) 325 MG Tab, Take 325 mg by mouth daily with breakfast., Disp: , Rfl:     fluticasone (FLONASE) 50 mcg/actuation nasal spray, 2 sprays by Each Nare route once daily. (Patient taking differently: 2 sprays by Each Nare route 2 (two) times daily. ), Disp: 16 Bottle, Rfl: 11    fluticasone-salmeterol 250-50 mcg/dose (ADVAIR) 250-50 mcg/dose diskus inhaler, Inhale 1 puff into the lungs 2 (two) times daily., Disp: 1 each, Rfl: 5    levothyroxine (SYNTHROID) 200 MCG tablet, TAKE 1 TABLET BY MOUTH EVERY DAY, Disp: 90 tablet, Rfl: 0    loratadine (CLARITIN) 10 mg tablet, Take 10 mg by mouth 2 (two) times daily., Disp: , Rfl:     meclizine (ANTIVERT) 25 mg tablet, Take 1 tablet (25 mg total) by mouth 3 (three) times daily as needed., Disp: 45 tablet, Rfl: 1    meloxicam  (MOBIC) 15 MG tablet, Take 1 tablet (15 mg total) by mouth once daily., Disp: 30 tablet, Rfl: 2    metformin (GLUCOPHAGE) 500 MG tablet, TAKE 1 TABLET BY MOUTH TWICE A DAY WITH MEALS, Disp: 180 tablet, Rfl: 0    promethazine (PHENERGAN) 25 MG tablet, Take 1 tablet (25 mg total) by mouth 3 (three) times daily as needed for Nausea., Disp: 30 tablet, Rfl: 0    sulfamethoxazole-trimethoprim 800-160mg (BACTRIM DS) 800-160 mg Tab, TAKE 1 TABLET BY MOUTH NIGHTLY, Disp: 90 tablet, Rfl: 0  Does patient have record of home blood pressure readings yes. Readings have been averaging 125/77.   Last dose of blood pressure medication was taken at 7:45am 7/13/17.  Patient is asymptomatic.   No complaints.    BP: 120/70 , Pulse: 90.

## 2017-07-13 NOTE — TELEPHONE ENCOUNTER
Pt came in today for nurse visit bp check. Pt denied having any abnormal symptoms such as sob, chest pain, or dizziness. Pt verified taking bp medication every day. Pt's bp reading today was 120/70, pulse 90, O2% 99.

## 2017-07-24 ENCOUNTER — PATIENT MESSAGE (OUTPATIENT)
Dept: INTERNAL MEDICINE | Facility: CLINIC | Age: 42
End: 2017-07-24

## 2017-07-30 ENCOUNTER — PATIENT MESSAGE (OUTPATIENT)
Dept: INTERNAL MEDICINE | Facility: CLINIC | Age: 42
End: 2017-07-30

## 2017-07-30 DIAGNOSIS — E11.8 TYPE 2 DIABETES MELLITUS WITH COMPLICATION, WITHOUT LONG-TERM CURRENT USE OF INSULIN: ICD-10-CM

## 2017-07-30 DIAGNOSIS — E03.9 HYPOTHYROIDISM, UNSPECIFIED TYPE: ICD-10-CM

## 2017-07-30 DIAGNOSIS — J30.9 ALLERGIC RHINITIS, UNSPECIFIED CHRONICITY, UNSPECIFIED SEASONALITY, UNSPECIFIED TRIGGER: ICD-10-CM

## 2017-07-30 DIAGNOSIS — I10 ESSENTIAL HYPERTENSION: Primary | ICD-10-CM

## 2017-07-30 DIAGNOSIS — J45.909 UNCOMPLICATED ASTHMA, UNSPECIFIED ASTHMA SEVERITY: ICD-10-CM

## 2017-07-31 RX ORDER — FLUTICASONE PROPIONATE AND SALMETEROL 250; 50 UG/1; UG/1
1 POWDER RESPIRATORY (INHALATION) 2 TIMES DAILY
Qty: 1 EACH | Refills: 5 | Status: SHIPPED | OUTPATIENT
Start: 2017-07-31

## 2017-07-31 RX ORDER — LEVOTHYROXINE SODIUM 200 UG/1
200 TABLET ORAL DAILY
Qty: 90 TABLET | Refills: 1 | Status: SHIPPED | OUTPATIENT
Start: 2017-07-31

## 2017-07-31 RX ORDER — MECLIZINE HYDROCHLORIDE 25 MG/1
25 TABLET ORAL 3 TIMES DAILY PRN
Qty: 45 TABLET | Refills: 1 | Status: SHIPPED | OUTPATIENT
Start: 2017-07-31 | End: 2017-08-10

## 2017-07-31 RX ORDER — AMLODIPINE BESYLATE 2.5 MG/1
2.5 TABLET ORAL DAILY
Qty: 90 TABLET | Refills: 1 | Status: SHIPPED | OUTPATIENT
Start: 2017-07-31 | End: 2017-12-29 | Stop reason: SDUPTHER

## 2017-07-31 RX ORDER — METFORMIN HYDROCHLORIDE 500 MG/1
500 TABLET ORAL 2 TIMES DAILY WITH MEALS
Qty: 180 TABLET | Refills: 2 | Status: SHIPPED | OUTPATIENT
Start: 2017-07-31 | End: 2017-10-17 | Stop reason: SDUPTHER

## 2017-07-31 RX ORDER — ALBUTEROL SULFATE 90 UG/1
2 AEROSOL, METERED RESPIRATORY (INHALATION) EVERY 4 HOURS PRN
Qty: 18 G | Refills: 5 | Status: SHIPPED | OUTPATIENT
Start: 2017-07-31

## 2017-07-31 RX ORDER — FLUTICASONE PROPIONATE 50 MCG
2 SPRAY, SUSPENSION (ML) NASAL DAILY
Qty: 16 BOTTLE | Refills: 11 | Status: SHIPPED | OUTPATIENT
Start: 2017-07-31

## 2017-08-08 RX ORDER — METFORMIN HYDROCHLORIDE 500 MG/1
TABLET ORAL
Qty: 180 TABLET | Refills: 0 | Status: SHIPPED | OUTPATIENT
Start: 2017-08-08 | End: 2017-08-28

## 2017-08-08 RX ORDER — LEVOTHYROXINE SODIUM 200 UG/1
TABLET ORAL
Qty: 90 TABLET | Refills: 0 | Status: SHIPPED | OUTPATIENT
Start: 2017-08-08 | End: 2017-08-28

## 2017-08-24 ENCOUNTER — PATIENT MESSAGE (OUTPATIENT)
Dept: INTERNAL MEDICINE | Facility: CLINIC | Age: 42
End: 2017-08-24

## 2017-08-28 ENCOUNTER — PATIENT MESSAGE (OUTPATIENT)
Dept: INTERNAL MEDICINE | Facility: CLINIC | Age: 42
End: 2017-08-28

## 2017-08-28 RX ORDER — SULFAMETHOXAZOLE AND TRIMETHOPRIM 800; 160 MG/1; MG/1
1 TABLET ORAL NIGHTLY
Qty: 90 TABLET | Refills: 0 | Status: SHIPPED | OUTPATIENT
Start: 2017-08-28

## 2017-10-16 ENCOUNTER — PATIENT MESSAGE (OUTPATIENT)
Dept: INTERNAL MEDICINE | Facility: CLINIC | Age: 42
End: 2017-10-16

## 2017-10-16 DIAGNOSIS — E11.8 TYPE 2 DIABETES MELLITUS WITH COMPLICATION, WITHOUT LONG-TERM CURRENT USE OF INSULIN: ICD-10-CM

## 2017-10-17 RX ORDER — METFORMIN HYDROCHLORIDE 500 MG/1
500 TABLET ORAL 2 TIMES DAILY WITH MEALS
Qty: 180 TABLET | Refills: 2 | Status: SHIPPED | OUTPATIENT
Start: 2017-10-17

## 2017-12-04 ENCOUNTER — TELEPHONE (OUTPATIENT)
Dept: OBSTETRICS AND GYNECOLOGY | Facility: CLINIC | Age: 42
End: 2017-12-04

## 2017-12-04 RX ORDER — NORETHINDRONE ACETATE AND ETHINYL ESTRADIOL 1MG-20(21)
KIT ORAL
Qty: 28 TABLET | Refills: 2 | Status: SHIPPED | OUTPATIENT
Start: 2017-12-04

## 2017-12-04 NOTE — TELEPHONE ENCOUNTER
Spoke with Que at [facility] pharmacy. Called in the pt's Rx refill for her BC Blisovi FE that failed to go through electronically. Que refilled the pt's Rx.

## 2017-12-29 DIAGNOSIS — I10 ESSENTIAL HYPERTENSION: Primary | ICD-10-CM

## 2017-12-29 RX ORDER — AMLODIPINE BESYLATE 2.5 MG/1
2.5 TABLET ORAL DAILY
Qty: 30 TABLET | Refills: 0 | Status: SHIPPED | OUTPATIENT
Start: 2017-12-29 | End: 2018-12-29

## 2017-12-29 NOTE — TELEPHONE ENCOUNTER
----- Message from Radha Frederick sent at 12/29/2017  9:43 AM CST -----  Contact: self  Pt needs a refill on her medication.     amlodipine (NORVASC) 2.5 MG tablet    Pt uses a pharmacy in [place].  [phone]    Pt can be reached at [phone]

## 2018-01-04 ENCOUNTER — TELEPHONE (OUTPATIENT)
Dept: INTERNAL MEDICINE | Facility: CLINIC | Age: 43
End: 2018-01-04

## 2018-01-04 NOTE — TELEPHONE ENCOUNTER
----- Message from Caroline Garces sent at 1/4/2018 11:33 AM CST -----  Contact: Rufino  _carlos  1st Request  _  2nd Request  _  3rd Request    Who:Rufino    Why: calling to verify directions for levothyroxine (SYNTHROID) 200 MCG tablet.. Please advise    What Number to Call Back: [phone]    When to Expect a call back: (Before the end of the day)   -- if call after 3:00 call back will be tomorrow.

## 2018-01-05 ENCOUNTER — TELEPHONE (OUTPATIENT)
Dept: INTERNAL MEDICINE | Facility: CLINIC | Age: 43
End: 2018-01-05

## 2018-01-05 NOTE — TELEPHONE ENCOUNTER
Pt was advised per Dr. Restrepo that her medications can no longer be filled since she's moved to [place] in July. Conversation was understood and it ended.

## 2018-01-05 NOTE — TELEPHONE ENCOUNTER
----- Message from Caroline Garces sent at 1/4/2018  4:19 PM CST -----  Contact: self  _x  1st Request  _  2nd Request  _  3rd Request    Who:pt     Why: pt returning calls.. please advise...    What Number to Call Back: [phone]    When to Expect a call back: (Before the end of the day)   -- if call after 3:00 call back will be tomorrow.

## 2019-01-11 DIAGNOSIS — Z12.39 BREAST CANCER SCREENING: ICD-10-CM
